# Patient Record
Sex: FEMALE | Race: WHITE | Employment: FULL TIME | URBAN - METROPOLITAN AREA
[De-identification: names, ages, dates, MRNs, and addresses within clinical notes are randomized per-mention and may not be internally consistent; named-entity substitution may affect disease eponyms.]

---

## 2022-02-15 ENCOUNTER — OFFICE VISIT (OUTPATIENT)
Dept: INTERNAL MEDICINE CLINIC | Age: 42
End: 2022-02-15
Payer: COMMERCIAL

## 2022-02-15 ENCOUNTER — TELEPHONE (OUTPATIENT)
Dept: INTERNAL MEDICINE CLINIC | Age: 42
End: 2022-02-15

## 2022-02-15 VITALS
OXYGEN SATURATION: 98 % | BODY MASS INDEX: 37.65 KG/M2 | TEMPERATURE: 98.7 F | SYSTOLIC BLOOD PRESSURE: 138 MMHG | HEART RATE: 76 BPM | HEIGHT: 70 IN | DIASTOLIC BLOOD PRESSURE: 79 MMHG | WEIGHT: 263 LBS | RESPIRATION RATE: 18 BRPM

## 2022-02-15 DIAGNOSIS — Z76.89 ENCOUNTER TO ESTABLISH CARE: ICD-10-CM

## 2022-02-15 DIAGNOSIS — Z02.89 ENCOUNTER FOR COMPLETION OF FORM WITH PATIENT: ICD-10-CM

## 2022-02-15 DIAGNOSIS — H93.11 TINNITUS, RIGHT: ICD-10-CM

## 2022-02-15 DIAGNOSIS — Z23 NEEDS FLU SHOT: ICD-10-CM

## 2022-02-15 DIAGNOSIS — J45.20 MILD INTERMITTENT ASTHMA WITHOUT COMPLICATION: ICD-10-CM

## 2022-02-15 DIAGNOSIS — R22.0 SCALP MASS: ICD-10-CM

## 2022-02-15 DIAGNOSIS — J45.20 MILD INTERMITTENT ASTHMA WITHOUT COMPLICATION: Primary | ICD-10-CM

## 2022-02-15 DIAGNOSIS — Z00.00 WELL ADULT HEALTH CHECK: Primary | ICD-10-CM

## 2022-02-15 PROCEDURE — 99204 OFFICE O/P NEW MOD 45 MIN: CPT | Performed by: INTERNAL MEDICINE

## 2022-02-15 PROCEDURE — 90471 IMMUNIZATION ADMIN: CPT | Performed by: INTERNAL MEDICINE

## 2022-02-15 PROCEDURE — 90686 IIV4 VACC NO PRSV 0.5 ML IM: CPT | Performed by: INTERNAL MEDICINE

## 2022-02-15 RX ORDER — ALBUTEROL SULFATE 0.83 MG/ML
SOLUTION RESPIRATORY (INHALATION)
COMMUNITY
End: 2022-02-15

## 2022-02-15 RX ORDER — NAPROXEN 250 MG/1
250 TABLET ORAL AS NEEDED
COMMUNITY

## 2022-02-15 RX ORDER — ALBUTEROL SULFATE 90 UG/1
2 AEROSOL, METERED RESPIRATORY (INHALATION)
Qty: 18 G | Refills: 2 | Status: SHIPPED | OUTPATIENT
Start: 2022-02-15 | End: 2022-03-09 | Stop reason: SDUPTHER

## 2022-02-15 NOTE — PROGRESS NOTES
rm 17    Chief Complaint   Patient presents with    New Patient     1. Have you been to the ER, urgent care clinic since your last visit? Hospitalized since your last visit? NO    2. Have you seen or consulted any other health care providers outside of the [address] [place] [address] since your last visit? Include any pap smears or colon screening.  No     Visit Vitals  /79 (BP 1 Location: Left arm, BP Patient Position: Sitting, BP Cuff Size: Adult)   Pulse 76   Temp 98.7 °F (37.1 °C) (Oral)   Resp 18   Ht 5' 10\" (1.778 m)   Wt 263 lb (119.3 kg)   SpO2 98%   BMI 37.74 kg/m²

## 2022-02-15 NOTE — TELEPHONE ENCOUNTER
Please review and prescribe an alternative if appropriate.    -------------------------------------------------  Dr. Luis Domínguez,             The [employer] is not covered by the pt's current insurance plan. Suggested alternatives are:  - Symbicort,  - Advair Diskus,   - Breo Ellipta INH,  - Advair HFA. Please review and prescribe alternative therapy or obtain a Prior Authorization. Requested Prescriptions     Pending Prescriptions Disp Refills    mometasone-formoterol (Dulera) 100-5 mcg/actuation HFA inhaler 13 g 3     Sig: Take 2 Puffs by inhalation two (2) times a day.

## 2022-02-15 NOTE — PATIENT INSTRUCTIONS
1.  Please follow the following instructions to process/authorize your referral, if needed:    Referrals processing  Please verify with your insurance IF you need referral authorization submitted. For insurance plans which require this, please follow the following steps. FAILURE TO DO SO MAY RESULT IN INABILITY TO SEE THE SPECIALIST YOU HAVE BEEN REFERRED TO (once you are scheduled to see them). 1. Call and schedule appointment with specialist  2. Call our clinic and leave message with provider name, and date of appointment  3. We will then submit the referral to your insurance. This process takes 2-5 business days. If you have questions about scheduling or authorizing referral, you can review with our referral coordinator. You can review with her today if available/if you have time, or you can call to review once you have made your referral/appointment. If you are not sure if you need referral authorizations, please review with the referral coordinator(s), either prior to or after you have made the appointment, as reviewed. 2.  If needed, you can see an ENT provider (Kath Favor and Throat specialists) at [facility] instead.     --[facility]:  Phone: [phone]

## 2022-02-15 NOTE — PROGRESS NOTES
Tracey Garcia (: [id number]) is a 38 y.o. female, new patient, here for evaluation of the following chief complaint(s):  Chief Complaint   Patient presents with    New Patient       Assessment and Plan:       ICD-10-CM ICD-9-CM    1. Mild intermittent asthma without complication  W26.13 929.64 mometasone-formoterol (Dulera) 100-5 mcg/actuation HFA inhaler      albuterol (PROVENTIL HFA, VENTOLIN HFA, PROAIR HFA) 90 mcg/actuation inhaler   2. Needs flu shot  Z23 V04.81 INFLUENZA VIRUS VAC QUAD,SPLIT,PRESV FREE SYRINGE IM   3. Scalp mass--frontal, in hair line, for past year  R22.0 782.2 REFERRAL TO ENT-OTOLARYNGOLOGY   4. Tinnitus, right  H93.11 388.30 REFERRAL TO ENT-OTOLARYNGOLOGY   5. Encounter for completion of form with patient  Z02.89 V68.89    6. Encounter to establish care  Z76.89 V65.8        1. Refills/new order reviewed at visit. 2.  Immunization(s) reviewed and updated at visit.    3-4. Findings and evaluation reviewed at visit. Referral(s) and referral coordination reviewed with patient at visit. 5.  DMV form completed at visit as reviewed. Follow-up and Dispositions    · Return in about 6 weeks (around 3/29/2022) for referral follow-up, yearly physical, fasting labs. lab results and schedule of future lab studies reviewed with patient  reviewed medications and side effects in detail    For additional documentation of information and/or recommendations discussed this visit, please see notes in instructions. Plan and evaluation (above) reviewed with pt at visit  Patient voiced understanding of plan and provided with time to ask/review questions. After Visit Summary (AVS) provided to pt after visit with additional instructions as needed/reviewed. Future Appointments   Date Time Provider Adarsh Connor   3/29/2022  1:30 PM Kristin Enriquez MD [facility]   --Updated future visits after patient check-out.       History of Present Illness:     Notes (nursing/rooming note copied below in italics):  As above    Here to establish care. Pt has no records in [facility] or [facility]. Only Care Everywhere records are UC/testing for COVID x 2. Both negative in 2020. She has moved from [place]. Needs handicapped placard replaced for [facility]. Notes had Workman's Comp eval over 3yr in [place]. She had an injury to her lower back. She has a nerve problem in her lower back with right sciatic-type pain. She had hurt her back pushing a \"gentleman in a wheelchair\". She had \"partial disability, restriction of ROM and restriction of lifting\". She is not following with specialists at this time. She had a PT plan and knows what exercises to do--just not doing that much now. Has no needs for daily activity/ADL mgt with current symptoms. She will have intermittent symptoms, but not chronic pain. No need identified for pain mgt, orthopedics, or PT at this time. She has a permanent disability. DMV form reviewed and completed at visit. Here 1.5yrs. She used Dulera regularly when in [place]. She notes she has asthma. She does not use controller regularly here. Has only used few times here. She doesn't need regularly now. Has rescue inhaler       She has a slowly enlarging mass of her midline frontal area. Now 1cm, slightly tender, but has enlarged over past year. G      She has had hysterectomy but has tubes/ovaries. She had recommendation with PCP for no further gyn screeing. Reviewed gyn referral, eval here or female provider PRN. She has not had screening mammogram yet. No problems noted on prior labs, so screening fasting labs at follow-up reviewed. Youngest child 22yr old. She thinks has Tetanus in last 10yrs--will clarify. Has some childhood vaccines she will send to us in [address] Nursing screenings reviewed by provider at visit. Prior to Admission medications    Medication Sig Start Date End Date Taking? Authorizing Provider   naproxen (NAPROSYN) 250 mg tablet Take 250 mg by mouth as needed. Yes Provider, Historical   albuterol (PROVENTIL VENTOLIN) 2.5 mg /3 mL (0.083 %) nebu by Nebulization route. Yes Provider, Historical   mometasone-formoterol (Dulera) 100-5 mcg/actuation HFA inhaler Take 2 Puffs by inhalation two (2) times a day. Yes Provider, Historical        ROS    Vitals:    02/15/22 0924   BP: 138/79   Pulse: 76   Resp: 18   Temp: 98.7 °F (37.1 °C)   TempSrc: Oral   SpO2: 98%   Weight: 263 lb (119.3 kg)   Height: 5' 10\" (1.778 m)      Body mass index is 37.74 kg/m². Physical Exam:     Physical Exam  Vitals and nursing note reviewed. Constitutional:       General: She is not in acute distress. Appearance: Normal appearance. She is well-developed. She is not diaphoretic. HENT:      Head: Normocephalic and atraumatic. Right Ear: Tympanic membrane, ear canal and external ear normal.      Left Ear: Tympanic membrane, ear canal and external ear normal.      Mouth/Throat:      Mouth: Mucous membranes are moist.   Eyes:      General: No scleral icterus. Right eye: No discharge. Left eye: No discharge. Conjunctiva/sclera: Conjunctivae normal.   Neck:      Comments: No carotid bruits bilat. Cardiovascular:      Rate and Rhythm: Normal rate and regular rhythm. Pulses: Normal pulses. Heart sounds: Normal heart sounds. No murmur heard. No friction rub. No gallop. Pulmonary:      Effort: Pulmonary effort is normal. No respiratory distress. Breath sounds: Normal breath sounds. No stridor. No wheezing, rhonchi or rales. Abdominal:      General: Bowel sounds are normal. There is no distension. Palpations: Abdomen is soft. Tenderness: There is no abdominal tenderness. There is no guarding. Musculoskeletal:         General: No swelling, tenderness, deformity or signs of injury. Cervical back: Neck supple. No rigidity or tenderness.  No muscular tenderness. Right lower leg: No edema. Left lower leg: No edema. Comments: Localizes intermittent pain to right lower lumbar area, and right thigh. Lymphadenopathy:      Cervical: No cervical adenopathy. Skin:     General: Skin is warm. Coloration: Skin is not jaundiced or pale. Findings: No bruising, erythema or rash. Neurological:      General: No focal deficit present. Mental Status: She is alert. Motor: No abnormal muscle tone. Coordination: Coordination normal.      Gait: Gait normal.   Psychiatric:         Mood and Affect: Mood normal.         Behavior: Behavior normal.         Thought Content: Thought content normal.         Judgment: Judgment normal.         An electronic signature was used to authenticate this note.   -- Jeison Rosales MD

## 2022-02-21 NOTE — TELEPHONE ENCOUNTER
The pharmacist called from [employer] stating that the pt would like  to call Advair 250 mcg to replace the [facility]. Since the Advair only has a co-pay of $10.00 co-pay.

## 2022-03-03 NOTE — TELEPHONE ENCOUNTER
here--pt prefers to do labs prior to follow-up appt:    Reviewed last note to establish care, and reviewed labs as below:    Future Appointments   Date Time Provider Adarsh Connor   3/9/2022 11:45 AM Lon Porter MD [facility]     Diagnoses and all orders for this visit:    1. Well adult health check  -     CBC WITH AUTOMATED DIFF; Future  -     METABOLIC PANEL, COMPREHENSIVE; Future  -     LIPID PANEL; Future  -     HEMOGLOBIN A1C WITH EAG; Future    2. Mild intermittent asthma without complication      Labs to  to review with pt. He is on her release for all info.

## 2022-03-09 ENCOUNTER — OFFICE VISIT (OUTPATIENT)
Dept: INTERNAL MEDICINE CLINIC | Age: 42
End: 2022-03-09
Payer: COMMERCIAL

## 2022-03-09 VITALS
HEART RATE: 70 BPM | OXYGEN SATURATION: 98 % | RESPIRATION RATE: 16 BRPM | WEIGHT: 264 LBS | DIASTOLIC BLOOD PRESSURE: 76 MMHG | SYSTOLIC BLOOD PRESSURE: 126 MMHG | BODY MASS INDEX: 37.88 KG/M2

## 2022-03-09 DIAGNOSIS — Z00.00 WELL ADULT HEALTH CHECK: ICD-10-CM

## 2022-03-09 DIAGNOSIS — Z12.31 ENCOUNTER FOR SCREENING MAMMOGRAM FOR MALIGNANT NEOPLASM OF BREAST: ICD-10-CM

## 2022-03-09 DIAGNOSIS — J45.20 MILD INTERMITTENT ASTHMA WITHOUT COMPLICATION: ICD-10-CM

## 2022-03-09 DIAGNOSIS — Z00.00 WELL WOMAN EXAM (NO GYNECOLOGICAL EXAM): Primary | ICD-10-CM

## 2022-03-09 PROCEDURE — 99396 PREV VISIT EST AGE 40-64: CPT | Performed by: INTERNAL MEDICINE

## 2022-03-09 RX ORDER — BUDESONIDE AND FORMOTEROL FUMARATE DIHYDRATE 160; 4.5 UG/1; UG/1
2 AEROSOL RESPIRATORY (INHALATION) 2 TIMES DAILY
Qty: 3 EACH | Refills: 3 | Status: SHIPPED | OUTPATIENT
Start: 2022-03-09

## 2022-03-09 RX ORDER — ALBUTEROL SULFATE 90 UG/1
2 AEROSOL, METERED RESPIRATORY (INHALATION)
Qty: 18 G | Refills: 2 | Status: SHIPPED | OUTPATIENT
Start: 2022-03-09

## 2022-03-09 NOTE — PROGRESS NOTES
Chief Complaint   Patient presents with    Complete Physical    Alopecia     Visit Vitals  /76   Pulse 70   Resp 16   Wt 264 lb (119.7 kg)   SpO2 98%   BMI 37.88 kg/m²     1. Have you been to the ER, urgent care clinic since your last visit? Hospitalized since your last visit? NO    2. Have you seen or consulted any other health care providers outside of the [address] [place] [address] since your last visit? Include any pap smears or colon screening.  NO

## 2022-03-09 NOTE — PROGRESS NOTES
Jaquelin Yao (: [id number]) is a 38 y.o. female, established patient, here for evaluation of the following chief complaint(s):  Chief Complaint   Patient presents with    Complete Physical    Alopecia       Assessment and Plan:       ICD-10-CM ICD-9-CM    1. Well woman exam (no gynecological exam)  Z00.00 V70.0 BENEDICT MAMMO BI SCREENING INCL CAD      US BREAST RT LIMITED=<3 QUAD   2. Encounter for screening mammogram for malignant neoplasm of breast  Z12.31 V76.12 BENEDICT MAMMO BI SCREENING INCL CAD      US BREAST RT LIMITED=<3 QUAD   3. Mild intermittent asthma without complication  T21.22 779.77 mometasone-formoterol (Dulera) 100-5 mcg/actuation HFA inhaler      albuterol (PROVENTIL HFA, VENTOLIN HFA, PROAIR HFA) 90 mcg/actuation inhaler   4. Well adult health check  Z00.00 V70.0 HEMOGLOBIN A1C WITH EAG      LIPID PANEL      METABOLIC PANEL, COMPREHENSIVE      CBC WITH AUTOMATED DIFF       1-2, 4:  Screenings reviewed at visit. Imaging and scheduling reviewed. 3.  Re-sent scripts as reviewed/requested. Follow-up and Dispositions    · Return in about 3 months (around 2022), or if symptoms worsen or fail to improve, for medication follow-up.       lab results and schedule of future lab studies reviewed with patient  reviewed medications and side effects in detail    For additional documentation of information and/or recommendations discussed this visit, please see notes in instructions. Plan and evaluation (above) reviewed with pt at visit  Patient voiced understanding of plan and provided with time to ask/review questions. After Visit Summary (AVS) provided to pt after visit with additional instructions as needed/reviewed. Future Appointments   Date Time Provider Adarsh Connor   3/24/2022  1:00 PM SHAMIKA [facility] [address] [facility]   --Updated future visits after patient check-out. History of Present Illness:     Notes (nursing/rooming note copied below in italics):   As above    She has had hysterectomy. She had no prior breast imaging. No abnormal findings on self-exam.    Reviewed mammogram     There are no preventive care reminders to display for this patient. HM reviewed with pt at visit. Nursing screenings reviewed by provider at visit. Past Medical History:   Diagnosis Date    Asthma      Past Surgical History:   Procedure Laterality Date    HX HYSTERECTOMY  2014        Prior to Admission medications    Medication Sig Start Date End Date Taking? Authorizing Provider   naproxen (NAPROSYN) 250 mg tablet Take 250 mg by mouth as needed. Yes Provider, Historical   mometasone-formoterol (Dulera) 100-5 mcg/actuation HFA inhaler Take 2 Puffs by inhalation two (2) times a day. 2/15/22  Yes Eliud Millard MD   albuterol (PROVENTIL HFA, VENTOLIN HFA, PROAIR HFA) 90 mcg/actuation inhaler Take 2 Puffs by inhalation every four (4) hours as needed for Wheezing. 2/15/22  Yes Eliud Millard MD        ROS    Vitals:    03/09/22 1144   BP: 126/76   Pulse: 70   Resp: 16   SpO2: 98%   Weight: 264 lb (119.7 kg)   PainSc:   0 - No pain      Body mass index is 37.88 kg/m². Physical Exam:     Physical Exam  Vitals and nursing note reviewed. Constitutional:       General: She is not in acute distress. Appearance: Normal appearance. She is well-developed. She is not diaphoretic. HENT:      Head: Normocephalic and atraumatic. Mouth/Throat:      Mouth: Mucous membranes are moist.   Eyes:      General: No scleral icterus. Right eye: No discharge. Left eye: No discharge. Conjunctiva/sclera: Conjunctivae normal.   Cardiovascular:      Rate and Rhythm: Normal rate and regular rhythm. Pulses: Normal pulses. Heart sounds: Normal heart sounds. No murmur heard. No friction rub. No gallop. Pulmonary:      Effort: Pulmonary effort is normal. No respiratory distress. Breath sounds: Normal breath sounds. No stridor.  No wheezing, rhonchi or rales. Abdominal:      General: Bowel sounds are normal. There is no distension. Palpations: Abdomen is soft. Tenderness: There is no abdominal tenderness. There is no guarding. Musculoskeletal:         General: No swelling, tenderness, deformity or signs of injury. Cervical back: Neck supple. No rigidity or tenderness. No muscular tenderness. Right lower leg: No edema. Left lower leg: No edema. Lymphadenopathy:      Cervical: No cervical adenopathy. Skin:     General: Skin is warm. Coloration: Skin is not jaundiced or pale. Findings: No bruising, erythema or rash. Neurological:      General: No focal deficit present. Mental Status: She is alert. Motor: No abnormal muscle tone. Coordination: Coordination normal.      Gait: Gait normal.   Psychiatric:         Mood and Affect: Mood normal.         Behavior: Behavior normal.         Thought Content: Thought content normal.         Judgment: Judgment normal.       Breast exam:  Breast exam with no masses palpated bilaterally. No skin changes or nipple discharge bilaterally. No axillary lymphadenopathy noted bilaterally. Pt and partner (with her consent) instructed in breast self exam during physician physical exam.  Nurse chaperone (CM) present for breast exam.    Fibrocystic changes lateral upper quadrant right breast (base), which are slightly tender. Non-tender fibrocystic changes upper inner quadrant right breast and lower inner quadrant left breast.  Findings reviewed with pt/partner, and eval with mammogram reviewed. An electronic signature was used to authenticate this note.   -- Ros Grullon MD

## 2022-03-09 NOTE — PATIENT INSTRUCTIONS
Comments for breast imaging:  Fibrocystic changes right upper lateral base, which are slightly tender. Non-tender fibrocystic changes upper inner right breast and lower inner left breast.     Well Visit, Ages 25 to 48: Care Instructions  Overview     Well visits can help you stay healthy. Your doctor has checked your overall health and may have suggested ways to take good care of yourself. Your doctor also may have recommended tests. At home, you can help prevent illness with healthy eating, regular exercise, and other steps. Follow-up care is a key part of your treatment and safety. Be sure to make and go to all appointments, and call your doctor if you are having problems. It's also a good idea to know your test results and keep a list of the medicines you take. How can you care for yourself at home? · Get screening tests that you and your doctor decide on. Screening helps find diseases before any symptoms appear. · Eat healthy foods. Choose fruits, vegetables, whole grains, protein, and low-fat dairy foods. Limit fat, especially saturated fat. Reduce salt in your diet. · Limit alcohol. If you are a man, have no more than 2 drinks a day or 14 drinks a week. If you are a woman, have no more than 1 drink a day or 7 drinks a week. · Get at least 30 minutes of physical activity on most days of the week. Walking is a good choice. You also may want to do other activities, such as running, swimming, cycling, or playing tennis or team sports. Discuss any changes in your exercise program with your doctor. · Reach and stay at a healthy weight. This will lower your risk for many problems, such as obesity, diabetes, heart disease, and high blood pressure. · Do not smoke or allow others to smoke around you. If you need help quitting, talk to your doctor about stop-smoking programs and medicines. These can increase your chances of quitting for good. · Care for your mental health.  It is easy to get weighed down by worry and stress. Learn strategies to manage stress, like deep breathing and mindfulness, and stay connected with your family and community. If you find you often feel sad or hopeless, talk with your doctor. Treatment can help. · Talk to your doctor about whether you have any risk factors for sexually transmitted infections (STIs). You can help prevent STIs if you wait to have sex with a new partner (or partners) until you've each been tested for STIs. It also helps if you use condoms (male or female condoms) and if you limit your sex partners to one person who only has sex with you. Vaccines are available for some STIs, such as HPV. · Use birth control if it's important to you to prevent pregnancy. Talk with your doctor about the choices available and what might be best for you. · If you think you may have a problem with alcohol or drug use, talk to your doctor. This includes prescription medicines (such as amphetamines and opioids) and illegal drugs (such as cocaine and methamphetamine). Your doctor can help you figure out what type of treatment is best for you. · Protect your skin from too much sun. When you're outdoors from 10 a.m. to 4 p.m., stay in the shade or cover up with clothing and a hat with a wide brim. Wear sunglasses that block UV rays. Even when it's cloudy, put broad-spectrum sunscreen (SPF 30 or higher) on any exposed skin. · See a dentist one or two times a year for checkups and to have your teeth cleaned. · Wear a seat belt in the car. When should you call for help? Watch closely for changes in your health, and be sure to contact your doctor if you have any problems or symptoms that concern you. Where can you learn more? Go to http://www."[employer].com/  Enter P072 in the search box to learn more about \"Well Visit, Ages 25 to 48: Care Instructions. \"  Current as of: October 6, 2021               Content Version: 13.2  © 9407-3590 Healthwise, [facility].    Care instructions adapted under license by [employer] (which disclaims liability or warranty for this information). If you have questions about a medical condition or this instruction, always ask your healthcare professional. [address] any warranty or liability for your use of this information.

## 2022-03-10 LAB
ALBUMIN SERPL-MCNC: 4 G/DL (ref 3.5–5)
ALBUMIN/GLOB SERPL: 1 {RATIO} (ref 1.1–2.2)
ALP SERPL-CCNC: 82 U/L (ref 45–117)
ALT SERPL-CCNC: 28 U/L (ref 12–78)
ANION GAP SERPL CALC-SCNC: 3 MMOL/L (ref 5–15)
AST SERPL-CCNC: 16 U/L (ref 15–37)
BASOPHILS # BLD: 0 K/UL (ref 0–0.1)
BASOPHILS NFR BLD: 0 % (ref 0–1)
BILIRUB SERPL-MCNC: 0.9 MG/DL (ref 0.2–1)
BUN SERPL-MCNC: 11 MG/DL (ref 6–20)
BUN/CREAT SERPL: 14 (ref 12–20)
CALCIUM SERPL-MCNC: 9.9 MG/DL (ref 8.5–10.1)
CHLORIDE SERPL-SCNC: 109 MMOL/L (ref 97–108)
CHOLEST SERPL-MCNC: 193 MG/DL
CO2 SERPL-SCNC: 25 MMOL/L (ref 21–32)
CREAT SERPL-MCNC: 0.8 MG/DL (ref 0.55–1.02)
DIFFERENTIAL METHOD BLD: NORMAL
EOSINOPHIL # BLD: 0.1 K/UL (ref 0–0.4)
EOSINOPHIL NFR BLD: 1 % (ref 0–7)
ERYTHROCYTE [DISTWIDTH] IN BLOOD BY AUTOMATED COUNT: 13.7 % (ref 11.5–14.5)
EST. AVERAGE GLUCOSE BLD GHB EST-MCNC: 105 MG/DL
GLOBULIN SER CALC-MCNC: 4.2 G/DL (ref 2–4)
GLUCOSE SERPL-MCNC: 79 MG/DL (ref 65–100)
HBA1C MFR BLD: 5.3 % (ref 4–5.6)
HCT VFR BLD AUTO: 44 % (ref 35–47)
HDLC SERPL-MCNC: 41 MG/DL
HDLC SERPL: 4.7 {RATIO} (ref 0–5)
HGB BLD-MCNC: 14.3 G/DL (ref 11.5–16)
IMM GRANULOCYTES # BLD AUTO: 0 K/UL (ref 0–0.04)
IMM GRANULOCYTES NFR BLD AUTO: 0 % (ref 0–0.5)
LDLC SERPL CALC-MCNC: 129.6 MG/DL (ref 0–100)
LYMPHOCYTES # BLD: 3.2 K/UL (ref 0.8–3.5)
LYMPHOCYTES NFR BLD: 32 % (ref 12–49)
MCH RBC QN AUTO: 28.6 PG (ref 26–34)
MCHC RBC AUTO-ENTMCNC: 32.5 G/DL (ref 30–36.5)
MCV RBC AUTO: 88 FL (ref 80–99)
MONOCYTES # BLD: 0.6 K/UL (ref 0–1)
MONOCYTES NFR BLD: 6 % (ref 5–13)
NEUTS SEG # BLD: 6.2 K/UL (ref 1.8–8)
NEUTS SEG NFR BLD: 61 % (ref 32–75)
NRBC # BLD: 0 K/UL (ref 0–0.01)
NRBC BLD-RTO: 0 PER 100 WBC
PLATELET # BLD AUTO: 272 K/UL (ref 150–400)
PMV BLD AUTO: 10.9 FL (ref 8.9–12.9)
POTASSIUM SERPL-SCNC: 4.2 MMOL/L (ref 3.5–5.1)
PROT SERPL-MCNC: 8.2 G/DL (ref 6.4–8.2)
RBC # BLD AUTO: 5 M/UL (ref 3.8–5.2)
SODIUM SERPL-SCNC: 137 MMOL/L (ref 136–145)
TRIGL SERPL-MCNC: 112 MG/DL (ref ?–150)
VLDLC SERPL CALC-MCNC: 22.4 MG/DL
WBC # BLD AUTO: 10.1 K/UL (ref 3.6–11)

## 2022-03-24 ENCOUNTER — HOSPITAL ENCOUNTER (OUTPATIENT)
Dept: MAMMOGRAPHY | Age: 42
Discharge: HOME | End: 2022-03-24
Attending: INTERNAL MEDICINE
Payer: COMMERCIAL

## 2022-03-24 ENCOUNTER — TRANSCRIBE ORDER (OUTPATIENT)
Dept: GENERAL RADIOLOGY | Age: 42
End: 2022-03-24

## 2022-03-24 DIAGNOSIS — Z12.31 SCREENING MAMMOGRAM, ENCOUNTER FOR: ICD-10-CM

## 2022-03-24 DIAGNOSIS — Z12.31 SCREENING MAMMOGRAM, ENCOUNTER FOR: Primary | ICD-10-CM

## 2022-03-24 PROCEDURE — 77063 BREAST TOMOSYNTHESIS BI: CPT

## 2022-03-25 ENCOUNTER — TELEPHONE (OUTPATIENT)
Dept: INTERNAL MEDICINE CLINIC | Age: 42
End: 2022-03-25

## 2022-03-25 NOTE — TELEPHONE ENCOUNTER
----- Message from Gely Willett sent at 7/05/3476  2:10 PM EST -----  Regarding: Inhaler approval  The [facility] pharmacy has contacted me about the Simbacort inhaler and unfortunately it is too expensive. Can you please call in for Advair as it has only a $10 co-pay in which i can afford? There was a request put in on 2/15 but it doesn't look like it was ever approved.      Thank you

## 2023-03-28 ENCOUNTER — HOSPITAL ENCOUNTER (OUTPATIENT)
Dept: MAMMOGRAPHY | Age: 43
Discharge: HOME | End: 2023-03-28
Attending: INTERNAL MEDICINE
Payer: COMMERCIAL

## 2023-03-28 DIAGNOSIS — Z12.31 ENCOUNTER FOR SCREENING MAMMOGRAM FOR MALIGNANT NEOPLASM OF BREAST: ICD-10-CM

## 2023-03-28 DIAGNOSIS — Z00.00 WELL WOMAN EXAM (NO GYNECOLOGICAL EXAM): ICD-10-CM

## 2023-03-28 PROCEDURE — 77063 BREAST TOMOSYNTHESIS BI: CPT

## 2023-05-22 ENCOUNTER — OFFICE VISIT (OUTPATIENT)
Age: 43
End: 2023-05-22
Payer: COMMERCIAL

## 2023-05-22 ENCOUNTER — TELEPHONE (OUTPATIENT)
Age: 43
End: 2023-05-22

## 2023-05-22 ENCOUNTER — NURSE TRIAGE (OUTPATIENT)
Dept: OTHER | Facility: CLINIC | Age: 43
End: 2023-05-22

## 2023-05-22 VITALS
TEMPERATURE: 98.5 F | SYSTOLIC BLOOD PRESSURE: 120 MMHG | DIASTOLIC BLOOD PRESSURE: 79 MMHG | BODY MASS INDEX: 37.99 KG/M2 | HEIGHT: 70 IN | WEIGHT: 265.4 LBS | OXYGEN SATURATION: 97 % | RESPIRATION RATE: 16 BRPM | HEART RATE: 68 BPM

## 2023-05-22 DIAGNOSIS — T78.40XA ALLERGY, INITIAL ENCOUNTER: ICD-10-CM

## 2023-05-22 DIAGNOSIS — J45.41 MODERATE PERSISTENT ASTHMA WITH ACUTE EXACERBATION: Primary | ICD-10-CM

## 2023-05-22 PROCEDURE — 99214 OFFICE O/P EST MOD 30 MIN: CPT | Performed by: NURSE PRACTITIONER

## 2023-05-22 PROCEDURE — 94640 AIRWAY INHALATION TREATMENT: CPT | Performed by: NURSE PRACTITIONER

## 2023-05-22 RX ORDER — MONTELUKAST SODIUM 10 MG/1
10 TABLET ORAL DAILY
Qty: 30 TABLET | Refills: 3 | Status: SHIPPED | OUTPATIENT
Start: 2023-05-22

## 2023-05-22 RX ORDER — AZELASTINE 1 MG/ML
1 SPRAY, METERED NASAL 2 TIMES DAILY
Qty: 60 ML | Refills: 1 | Status: SHIPPED | OUTPATIENT
Start: 2023-05-22

## 2023-05-22 RX ORDER — ALBUTEROL SULFATE 90 UG/1
2 AEROSOL, METERED RESPIRATORY (INHALATION) EVERY 4 HOURS PRN
Qty: 18 G | Refills: 3 | Status: SHIPPED | OUTPATIENT
Start: 2023-05-22

## 2023-05-22 RX ORDER — PREDNISONE 20 MG/1
40 TABLET ORAL DAILY
Qty: 10 TABLET | Refills: 0 | Status: SHIPPED | OUTPATIENT
Start: 2023-05-22 | End: 2023-05-27

## 2023-05-22 RX ORDER — ALBUTEROL SULFATE 2.5 MG/3ML
2.5 SOLUTION RESPIRATORY (INHALATION) ONCE
Status: COMPLETED | OUTPATIENT
Start: 2023-05-22 | End: 2023-05-22

## 2023-05-22 RX ORDER — ALBUTEROL SULFATE 2.5 MG/3ML
2.5 SOLUTION RESPIRATORY (INHALATION) EVERY 4 HOURS PRN
Qty: 120 EACH | Refills: 3 | Status: SHIPPED | OUTPATIENT
Start: 2023-05-22

## 2023-05-22 RX ORDER — BUDESONIDE AND FORMOTEROL FUMARATE DIHYDRATE 160; 4.5 UG/1; UG/1
2 AEROSOL RESPIRATORY (INHALATION) 2 TIMES DAILY
Qty: 30.6 G | Refills: 2 | Status: SHIPPED | OUTPATIENT
Start: 2023-05-22

## 2023-05-22 RX ADMIN — ALBUTEROL SULFATE 2.5 MG: 2.5 SOLUTION RESPIRATORY (INHALATION) at 10:11

## 2023-05-22 SDOH — ECONOMIC STABILITY: FOOD INSECURITY: WITHIN THE PAST 12 MONTHS, THE FOOD YOU BOUGHT JUST DIDN'T LAST AND YOU DIDN'T HAVE MONEY TO GET MORE.: NEVER TRUE

## 2023-05-22 SDOH — ECONOMIC STABILITY: HOUSING INSECURITY
IN THE LAST 12 MONTHS, WAS THERE A TIME WHEN YOU DID NOT HAVE A STEADY PLACE TO SLEEP OR SLEPT IN A SHELTER (INCLUDING NOW)?: NO

## 2023-05-22 SDOH — ECONOMIC STABILITY: FOOD INSECURITY: WITHIN THE PAST 12 MONTHS, YOU WORRIED THAT YOUR FOOD WOULD RUN OUT BEFORE YOU GOT MONEY TO BUY MORE.: NEVER TRUE

## 2023-05-22 SDOH — ECONOMIC STABILITY: INCOME INSECURITY: HOW HARD IS IT FOR YOU TO PAY FOR THE VERY BASICS LIKE FOOD, HOUSING, MEDICAL CARE, AND HEATING?: NOT HARD AT ALL

## 2023-05-22 ASSESSMENT — PATIENT HEALTH QUESTIONNAIRE - PHQ9
SUM OF ALL RESPONSES TO PHQ QUESTIONS 1-9: 0
2. FEELING DOWN, DEPRESSED OR HOPELESS: 0
SUM OF ALL RESPONSES TO PHQ9 QUESTIONS 1 & 2: 0
SUM OF ALL RESPONSES TO PHQ QUESTIONS 1-9: 0
1. LITTLE INTEREST OR PLEASURE IN DOING THINGS: 0

## 2023-05-22 ASSESSMENT — SOCIAL DETERMINANTS OF HEALTH (SDOH): HOW HARD IS IT FOR YOU TO PAY FOR THE VERY BASICS LIKE FOOD, HOUSING, MEDICAL CARE, AND HEATING?: NOT HARD AT ALL

## 2023-05-22 NOTE — TELEPHONE ENCOUNTER
Location of patient: [facility]    Received call from [facility] with [employer]. Subjective: Caller states \"allergies\"     Current Symptoms: head congestion stuffy runny nose and chest congestion coughing , dif breathing , wheezing , h/a,symptoms comes and goes thru out the day , pain in chest also hx asthma, and is out of her inhalers sob mainly with activity , mucus clear not wheezing currently, needs her inhaler refilled     Onset: months    Associated Symptoms: NA    Pain Severity: 6/10    Temperature: none  as far as knows     What has been tried: allergy meds    LMP: NA Pregnant: NA    Recommended disposition: See in Office Today or Tomorrow    Care advice provided, patient verbalizes understanding; denies any other questions or concerns; instructed to call back for any new or worsening symptoms. Patient/Caller agrees with recommended disposition; writer provided warm transfer to Evelin Wilson at [facility] for appointment scheduling    Attention Provider: Thank you for allowing me to participate in the care of your patient. The patient was connected to triage in response to information provided to the ECC/PSC. Please do not respond through this encounter as the response is not directed to a shared pool.          Reason for Disposition   Difficulty breathing and only from stuffy nose   Lots of coughing    Protocols used: Breathing Difficulty-ADULT-[place], Sinus Pain or Congestion-ADULT-[place]

## 2023-05-22 NOTE — PROGRESS NOTES
Chief Complaint   Patient presents with    Cough     Nasal congestion, cough, wheezing x 3 months       Vitals:    05/22/23 0918   BP: 120/79   Pulse: 68   Resp: 16   Temp: 98.5 °F (36.9 °C)   SpO2: 97%       No flowsheet data found. 1. \"Have you been to the ER, urgent care clinic since your last visit? Hospitalized since your last visit? \" No    2. \"Have you seen or consulted any other health care providers outside of the [address] [place] [address] since your last visit? \" No     3. For patients aged 39-70: Has the patient had a colonoscopy / FIT/ Cologuard? No      If the patient is female:    4. For patients aged 41-77: Has the patient had a mammogram within the past 2 years? No      5. For patients aged 21-65: Has the patient had a pap smear? No     Health Maintenance Due   Topic Date Due    Varicella vaccine (1 of 2 - 2-dose childhood series) Never done    HIV screen  Never done    Depression Screen  03/09/2023       No flowsheet data found. Social Determinants of Health     Tobacco Use: Medium Risk    Smoking Tobacco Use: Former    Smokeless Tobacco Use: Never    Passive Exposure: Not on file   Alcohol Use: Not on file   Financial Resource Strain: Low Risk     Difficulty of Paying Living Expenses: Not hard at all   Food Insecurity: No Food Insecurity    Worried About [address] in the Last Year: Never true    [address] in the Last Year: Never true   Transportation Needs: Unknown    Lack of Transportation (Medical): Not on file    Lack of Transportation (Non-Medical):  No   Physical Activity: Not on file   Stress: Not on file   Social Connections: Not on file   Intimate Partner Violence: Not on file   Depression: Not at risk    PHQ-2 Score: 0   Housing Stability: Unknown    Unable to Pay for Housing in the Last Year: Not on file    Number of Places Lived in the Last Year: Not on file    Unstable Housing in the Last Year: No      AVS  education, follow up, and recommendations provided and

## 2023-05-22 NOTE — PROGRESS NOTES
Subjective:      Patient ID: Jossue Gill is a 41 y.o. female. HPI    Chief Complaint   Patient presents with    Cough     Nasal congestion, cough, wheezing x 3 months      Itchy watery eyes, runny nose, cough    Out of asthma inhalers for sometime partly d/t change in  insurance     She has tied Zyrtec and  Allegra currently, Benadryl, Aleve D for allergy symptoms     Usually has seasonal allergies; current symptoms more severe  No history of hospitalization for asthma     No pattern to symptoms. Symptoms are random    Works from home, changed filters       Past Medical History:   Diagnosis Date    Asthma     Menopause           Allergies   Allergen Reactions    Cephalosporins Hives    Oxycodone-Acetaminophen Itching    Penicillins Hives        Current Outpatient Medications   Medication Sig    Fexofenadine-Pseudoephedrine (ALLEGRA-D 24 HOUR PO) Take by mouth    albuterol sulfate HFA (PROVENTIL;VENTOLIN;PROAIR) 108 (90 Base) MCG/ACT inhaler Inhale 2 puffs into the lungs every 4 hours as needed for Wheezing or Shortness of Breath    montelukast (SINGULAIR) 10 MG tablet Take 1 tablet by mouth daily    albuterol (PROVENTIL) (2.5 MG/3ML) 0.083% nebulizer solution Take 3 mLs by nebulization every 4 hours as needed for Wheezing    azelastine (ASTELIN) 0.1 % nasal spray 1 spray by Nasal route 2 times daily Use in each nostril as directed    budesonide-formoterol (SYMBICORT) 160-4.5 MCG/ACT AERO Inhale 2 puffs into the lungs 2 times daily    predniSONE (DELTASONE) 20 MG tablet Take 2 tablets by mouth daily for 5 days    naproxen (NAPROSYN) 250 MG tablet Take 1 tablet by mouth as needed     No current facility-administered medications for this visit. Past Surgical History:   Procedure Laterality Date    HYSTERECTOMY (CERVIX STATUS UNKNOWN)  2014          Review of Systems   Constitutional:  Positive for fatigue. HENT:  Positive for congestion. Eyes: Negative.     Respiratory:  Positive for chest tightness,

## 2023-05-24 ASSESSMENT — ENCOUNTER SYMPTOMS
EYES NEGATIVE: 1
CHEST TIGHTNESS: 1
SHORTNESS OF BREATH: 1
WHEEZING: 1

## 2023-05-27 RX ORDER — FLUTICASONE PROPIONATE AND SALMETEROL 250; 50 UG/1; UG/1
1 POWDER RESPIRATORY (INHALATION) EVERY 12 HOURS
Qty: 60 EACH | Refills: 3 | Status: SHIPPED | OUTPATIENT
Start: 2023-05-27

## 2023-05-27 RX ORDER — LEVALBUTEROL INHALATION SOLUTION 0.31 MG/3ML
0.31 SOLUTION RESPIRATORY (INHALATION) EVERY 4 HOURS PRN
Qty: 90 ML | Refills: 1 | Status: SHIPPED | OUTPATIENT
Start: 2023-05-27

## 2023-06-08 ENCOUNTER — TELEMEDICINE (OUTPATIENT)
Age: 43
End: 2023-06-08
Payer: COMMERCIAL

## 2023-06-08 DIAGNOSIS — J45.41 MODERATE PERSISTENT ASTHMA WITH ACUTE EXACERBATION: Primary | ICD-10-CM

## 2023-06-08 PROCEDURE — 99213 OFFICE O/P EST LOW 20 MIN: CPT | Performed by: NURSE PRACTITIONER

## 2023-06-08 RX ORDER — FLUTICASONE PROPIONATE 110 UG/1
2 AEROSOL, METERED RESPIRATORY (INHALATION) 2 TIMES DAILY
Qty: 12 G | Refills: 3 | Status: SHIPPED | OUTPATIENT
Start: 2023-06-08 | End: 2024-06-07

## 2023-07-10 ASSESSMENT — ENCOUNTER SYMPTOMS
CHEST TIGHTNESS: 1
SHORTNESS OF BREATH: 0
WHEEZING: 1

## 2023-07-10 NOTE — PROGRESS NOTES
Subjective:      Patient ID: Zack Wright is a 41 y.o. female. Zack Wright, was evaluated through a synchronous (real-time) audio-video encounter. The patient (or guardian if applicable) is aware that this is a billable service, which includes applicable co-pays. This Virtual Visit was conducted with patient's (and/or legal guardian's) consent. Patient identification was verified, and a caregiver was present when appropriate. The patient was located at Home: [address] [place] [address]  Provider was located at [facility] (Appt Dept): [address] [facility],  [address] [facility]     Asthma  She complains of wheezing. There is no shortness of breath. Her past medical history is significant for asthma. She requests asthma medication refill     Asthma controlled until she stopped all medications for 1 week prior to allergy testing   Saw Allergist this morning   She is allergic to dust mite, cats, dogs and cockroach    Past Medical History:   Diagnosis Date    Asthma     Menopause         Review of Systems   Constitutional: Negative. Respiratory:  Positive for chest tightness and wheezing. Negative for shortness of breath. Neurological: Negative. Objective:   Physical Exam  Constitutional:       General: She is not in acute distress. Appearance: Normal appearance. She is not ill-appearing. Pulmonary:      Comments: She speaks in long clear sentences; no conversational dyspnea  Neurological:      Mental Status: She is alert and oriented to person, place, and time. Mental status is at baseline. Psychiatric:         Mood and Affect: Mood normal.         Behavior: Behavior normal.         Thought Content: Thought content normal.       Assessment:      Deborah Yusuf was seen today for follow-up and asthma. Diagnoses and all orders for this visit:    Moderate persistent asthma with acute exacerbation  -     fluticasone (FLOVENT HFA) 110 MCG/ACT inhaler;  Inhale 2 puffs

## 2023-08-12 DIAGNOSIS — T78.40XA ALLERGY, INITIAL ENCOUNTER: ICD-10-CM

## 2023-08-14 RX ORDER — AZELASTINE HYDROCHLORIDE 137 UG/1
SPRAY, METERED NASAL
Refills: 1 | OUTPATIENT
Start: 2023-08-14

## 2023-08-15 ENCOUNTER — TELEPHONE (OUTPATIENT)
Age: 43
End: 2023-08-15

## 2023-08-15 NOTE — TELEPHONE ENCOUNTER
Spoke with patient, She does not want to continue to be a patient of Dr. Farhat Calderón and advised me to cancel upcoming appointment. Advised to go to [address] [place] [address] or ER for any worsening symptoms of depression. Pt agreed and voiced understanding. Narayan Hampton LPN

## 2023-08-15 NOTE — TELEPHONE ENCOUNTER
Pt unable to reg body temp or emotions; also past due for physical, but next avail phys appt too far out/pt unavail (9/28/23). Last phys 3/09/2022. Scheduled OV for 9/14/23, but pt requests a call back re: depression, and a sooner appt, and/or physical appt prior to 9/28/23 if possible.  [facility]# [phone]

## 2023-10-01 SDOH — HEALTH STABILITY: PHYSICAL HEALTH: ON AVERAGE, HOW MANY MINUTES DO YOU ENGAGE IN EXERCISE AT THIS LEVEL?: 20 MIN

## 2023-10-01 SDOH — HEALTH STABILITY: PHYSICAL HEALTH: ON AVERAGE, HOW MANY DAYS PER WEEK DO YOU ENGAGE IN MODERATE TO STRENUOUS EXERCISE (LIKE A BRISK WALK)?: 2 DAYS

## 2023-10-01 ASSESSMENT — SOCIAL DETERMINANTS OF HEALTH (SDOH)
WITHIN THE LAST YEAR, HAVE YOU BEEN KICKED, HIT, SLAPPED, OR OTHERWISE PHYSICALLY HURT BY YOUR PARTNER OR EX-PARTNER?: NO
WITHIN THE LAST YEAR, HAVE YOU BEEN AFRAID OF YOUR PARTNER OR EX-PARTNER?: NO
WITHIN THE LAST YEAR, HAVE TO BEEN RAPED OR FORCED TO HAVE ANY KIND OF SEXUAL ACTIVITY BY YOUR PARTNER OR EX-PARTNER?: NO
WITHIN THE LAST YEAR, HAVE YOU BEEN HUMILIATED OR EMOTIONALLY ABUSED IN OTHER WAYS BY YOUR PARTNER OR EX-PARTNER?: NO

## 2023-10-04 ENCOUNTER — OFFICE VISIT (OUTPATIENT)
Age: 43
End: 2023-10-04
Payer: COMMERCIAL

## 2023-10-04 VITALS
SYSTOLIC BLOOD PRESSURE: 124 MMHG | HEART RATE: 84 BPM | WEIGHT: 270.2 LBS | OXYGEN SATURATION: 94 % | HEIGHT: 68 IN | TEMPERATURE: 98.3 F | RESPIRATION RATE: 18 BRPM | DIASTOLIC BLOOD PRESSURE: 84 MMHG | BODY MASS INDEX: 40.95 KG/M2

## 2023-10-04 DIAGNOSIS — G43.109 MIGRAINE WITH AURA AND WITHOUT STATUS MIGRAINOSUS, NOT INTRACTABLE: ICD-10-CM

## 2023-10-04 DIAGNOSIS — Z76.89 ENCOUNTER TO ESTABLISH CARE: ICD-10-CM

## 2023-10-04 DIAGNOSIS — F33.0 MILD EPISODE OF RECURRENT MAJOR DEPRESSIVE DISORDER (HCC): ICD-10-CM

## 2023-10-04 DIAGNOSIS — R53.83 OTHER FATIGUE: ICD-10-CM

## 2023-10-04 DIAGNOSIS — Z00.00 ADULT GENERAL MEDICAL EXAM: Primary | ICD-10-CM

## 2023-10-04 PROCEDURE — 99204 OFFICE O/P NEW MOD 45 MIN: CPT | Performed by: STUDENT IN AN ORGANIZED HEALTH CARE EDUCATION/TRAINING PROGRAM

## 2023-10-04 RX ORDER — VENLAFAXINE HYDROCHLORIDE 37.5 MG/1
37.5 CAPSULE, EXTENDED RELEASE ORAL DAILY
Qty: 90 CAPSULE | Refills: 0 | Status: SHIPPED | OUTPATIENT
Start: 2023-10-04 | End: 2024-01-02

## 2023-10-04 SDOH — ECONOMIC STABILITY: INCOME INSECURITY: HOW HARD IS IT FOR YOU TO PAY FOR THE VERY BASICS LIKE FOOD, HOUSING, MEDICAL CARE, AND HEATING?: SOMEWHAT HARD

## 2023-10-04 SDOH — ECONOMIC STABILITY: FOOD INSECURITY: WITHIN THE PAST 12 MONTHS, YOU WORRIED THAT YOUR FOOD WOULD RUN OUT BEFORE YOU GOT MONEY TO BUY MORE.: NEVER TRUE

## 2023-10-04 SDOH — ECONOMIC STABILITY: FOOD INSECURITY: WITHIN THE PAST 12 MONTHS, THE FOOD YOU BOUGHT JUST DIDN'T LAST AND YOU DIDN'T HAVE MONEY TO GET MORE.: NEVER TRUE

## 2023-10-04 ASSESSMENT — ENCOUNTER SYMPTOMS
EYE PAIN: 0
SHORTNESS OF BREATH: 0
NAUSEA: 0
VOMITING: 0
RHINORRHEA: 0
CHEST TIGHTNESS: 0
ABDOMINAL PAIN: 0
WHEEZING: 0
DIARRHEA: 0
COLOR CHANGE: 0
CONSTIPATION: 0
BACK PAIN: 0
COUGH: 0

## 2023-10-04 ASSESSMENT — PATIENT HEALTH QUESTIONNAIRE - PHQ9
2. FEELING DOWN, DEPRESSED OR HOPELESS: 1
SUM OF ALL RESPONSES TO PHQ QUESTIONS 1-9: 2
SUM OF ALL RESPONSES TO PHQ9 QUESTIONS 1 & 2: 2
SUM OF ALL RESPONSES TO PHQ QUESTIONS 1-9: 2
SUM OF ALL RESPONSES TO PHQ QUESTIONS 1-9: 2
1. LITTLE INTEREST OR PLEASURE IN DOING THINGS: 1
SUM OF ALL RESPONSES TO PHQ QUESTIONS 1-9: 2

## 2023-10-04 NOTE — PROGRESS NOTES
NP   budesonide-formoterol (SYMBICORT) 160-4.5 MCG/ACT AERO Inhale 2 puffs into the lungs 2 times daily 23  Yes Bernard Henrying, APRN - NP   naproxen (NAPROSYN) 250 MG tablet Take 1 tablet by mouth as needed   Yes Automatic Reconciliation, Ar   fluticasone (FLOVENT HFA) 110 MCG/ACT inhaler Inhale 2 puffs into the lungs 2 times daily  Patient not taking: Reported on 10/4/2023 6/8/23 6/7/24  Bernard Wakefield, APRN - NP   levalbuterol Jhonnie Markham) 0.31 MG/3ML nebulization Take 3 mLs by nebulization every 4 hours as needed for Wheezing  Patient not taking: Reported on 10/4/2023 5/27/23   Bernard Wakefield, APRN - NP   fluticasone-salmeterol (ADVAIR DISKUS) 250-50 MCG/ACT AEPB diskus inhaler Inhale 1 puff into the lungs in the morning and 1 puff in the evening. Patient not taking: Reported on 10/4/2023 5/27/23   Bernard Wakefield, APRN - NP   Fexofenadine-Pseudoephedrine (ALLEGRA-D 24 HOUR PO) Take by mouth  Patient not taking: Reported on 10/4/2023    Provider, Historical, MD       Surgical History    Past Surgical History:   Procedure Laterality Date    HYSTERECTOMY (CERVIX STATUS UNKNOWN)         Family History    Family History   Problem Relation Age of Onset    Diabetes Mother     High Cholesterol Mother     Fibromyalgia Mother     Diabetes Maternal Grandfather     High Cholesterol Maternal Grandfather         Social History    Social History     Occupational History    Not on file   Tobacco Use    Smoking status: Former     Packs/day: 0.50     Years: 3.00     Additional pack years: 0.00     Total pack years: 1.50     Types: Cigarettes     Quit date: 2015     Years since quittin.7    Smokeless tobacco: Never   Substance and Sexual Activity    Alcohol use:  Yes     Alcohol/week: 1.0 standard drink of alcohol     Types: 1 Glasses of wine per week    Drug use: Never    Sexual activity: Not on file        The following sections were reviewed & updated as appropriate: Problem List, Allergies, PMH, PSH, FH, and

## 2023-10-05 LAB
T4 FREE SERPL-MCNC: 0.8 NG/DL (ref 0.8–1.5)
TSH SERPL DL<=0.05 MIU/L-ACNC: 2.88 UIU/ML (ref 0.36–3.74)

## 2023-11-28 ENCOUNTER — TELEPHONE (OUTPATIENT)
Age: 43
End: 2023-11-28

## 2023-11-28 NOTE — TELEPHONE ENCOUNTER
Received message from patient. Ok to schedule virtual appointment for follow-up? Samantha Mckoy  [facility]  Staff 4 minutes ago (11:33 AM)     NF  Appointment Request From: Samantha Mckoy     With Provider: Lizz Kohli DO [[facility]]     Preferred Date Range: 11/29/2023 - 12/15/2023     Preferred Times: Any Time     Reason for visit: Request an Appointment     Comments:   Follow up on meds/ prefer phone follow up

## 2023-12-08 ENCOUNTER — TELEMEDICINE (OUTPATIENT)
Age: 43
End: 2023-12-08
Payer: COMMERCIAL

## 2023-12-08 DIAGNOSIS — F33.0 MILD EPISODE OF RECURRENT MAJOR DEPRESSIVE DISORDER (HCC): ICD-10-CM

## 2023-12-08 PROCEDURE — 99214 OFFICE O/P EST MOD 30 MIN: CPT | Performed by: STUDENT IN AN ORGANIZED HEALTH CARE EDUCATION/TRAINING PROGRAM

## 2023-12-08 RX ORDER — VENLAFAXINE HYDROCHLORIDE 37.5 MG/1
37.5 CAPSULE, EXTENDED RELEASE ORAL DAILY
Qty: 90 CAPSULE | Refills: 3 | Status: SHIPPED | OUTPATIENT
Start: 2023-12-08 | End: 2024-12-02

## 2023-12-08 ASSESSMENT — PATIENT HEALTH QUESTIONNAIRE - PHQ9
SUM OF ALL RESPONSES TO PHQ QUESTIONS 1-9: 1
10. IF YOU CHECKED OFF ANY PROBLEMS, HOW DIFFICULT HAVE THESE PROBLEMS MADE IT FOR YOU TO DO YOUR WORK, TAKE CARE OF THINGS AT HOME, OR GET ALONG WITH OTHER PEOPLE: 1
8. MOVING OR SPEAKING SO SLOWLY THAT OTHER PEOPLE COULD HAVE NOTICED. OR THE OPPOSITE, BEING SO FIGETY OR RESTLESS THAT YOU HAVE BEEN MOVING AROUND A LOT MORE THAN USUAL: 1
4. FEELING TIRED OR HAVING LITTLE ENERGY: 3
SUM OF ALL RESPONSES TO PHQ QUESTIONS 1-9: 1
9. THOUGHTS THAT YOU WOULD BE BETTER OFF DEAD, OR OF HURTING YOURSELF: 0
SUM OF ALL RESPONSES TO PHQ QUESTIONS 1-9: 6
SUM OF ALL RESPONSES TO PHQ QUESTIONS 1-9: 6
2. FEELING DOWN, DEPRESSED OR HOPELESS: 0
SUM OF ALL RESPONSES TO PHQ QUESTIONS 1-9: 6
7. TROUBLE CONCENTRATING ON THINGS, SUCH AS READING THE NEWSPAPER OR WATCHING TELEVISION: 1
SUM OF ALL RESPONSES TO PHQ QUESTIONS 1-9: 1
SUM OF ALL RESPONSES TO PHQ QUESTIONS 1-9: 6
5. POOR APPETITE OR OVEREATING: 0
SUM OF ALL RESPONSES TO PHQ QUESTIONS 1-9: 1
SUM OF ALL RESPONSES TO PHQ9 QUESTIONS 1 & 2: 0
6. FEELING BAD ABOUT YOURSELF - OR THAT YOU ARE A FAILURE OR HAVE LET YOURSELF OR YOUR FAMILY DOWN: 1
1. LITTLE INTEREST OR PLEASURE IN DOING THINGS: 1
1. LITTLE INTEREST OR PLEASURE IN DOING THINGS: 0
3. TROUBLE FALLING OR STAYING ASLEEP: 0

## 2023-12-08 ASSESSMENT — COLUMBIA-SUICIDE SEVERITY RATING SCALE - C-SSRS
7. DID THIS OCCUR IN THE LAST THREE MONTHS: NO
3. HAVE YOU BEEN THINKING ABOUT HOW YOU MIGHT KILL YOURSELF?: NO
5. HAVE YOU STARTED TO WORK OUT OR WORKED OUT THE DETAILS OF HOW TO KILL YOURSELF? DO YOU INTEND TO CARRY OUT THIS PLAN?: NO
4. HAVE YOU HAD THESE THOUGHTS AND HAD SOME INTENTION OF ACTING ON THEM?: NO

## 2023-12-08 NOTE — PROGRESS NOTES
Con Mcintosh is a 41 y.o. female who was seen by synchronous (real-time) audio-video technology on 12/8/2023. Con Mcintosh, was evaluated through a synchronous (real-time) audio-video encounter. The patient (or guardian if applicable) is aware that this is a billable service, which includes applicable co-pays. This Virtual Visit was conducted with patient's (and/or legal guardian's) consent. Patient identification was verified, and a caregiver was present when appropriate. The patient was located at Home: [address] [place] [address]  Provider was located at [facility] ([address] [place], [place] [address]t): [address] [facility],  [address]        Subjective:   Con Mcintosh was seen for Follow-up (Pt stated that she was started on new medication [facility]) & follow up. [address])    At her last visit we started venlafaxine for mood lability and hot flashes. She says that her hot flashes are still present, but her mood is very much improved. She says that she does not have as many drastic ups and downs and has been feeling much better. She says that she has had some daytime sleepiness since starting the effexor but not sure that they are related. She says that she has also had a couple of panic attacks recently, related to her in-laws moving in with her and her . She denies any other new complaints today. Past Medical History:   Diagnosis Date    Asthma     Menopause     Migraine        Prior to Admission medications    Medication Sig Start Date End Date Taking?  Authorizing Provider   venlafaxine (EFFEXOR XR) 37.5 MG extended release capsule Take 1 capsule by mouth daily 12/8/23 12/2/24 Yes Melissa Gifford, DO   albuterol sulfate HFA (PROVENTIL;VENTOLIN;PROAIR) 108 (90 Base) MCG/ACT inhaler Inhale 2 puffs into the lungs every 4 hours as needed for Wheezing or Shortness of Breath 5/22/23  Yes DAVID Hebert NP   montelukast (SINGULAIR) 10 MG tablet Take 1 tablet by mouth

## 2024-01-11 ENCOUNTER — PATIENT MESSAGE (OUTPATIENT)
Age: 44
End: 2024-01-11

## 2024-01-11 DIAGNOSIS — J45.41 MODERATE PERSISTENT ASTHMA WITH ACUTE EXACERBATION: ICD-10-CM

## 2024-01-11 RX ORDER — MONTELUKAST SODIUM 10 MG/1
10 TABLET ORAL DAILY
Qty: 90 TABLET | Refills: 3 | Status: SHIPPED | OUTPATIENT
Start: 2024-01-11 | End: 2025-01-05

## 2024-01-11 NOTE — TELEPHONE ENCOUNTER
From: Martha Pelaez  To: Dr. Don Hernandez  Sent: 1/11/2024 8:48 AM EST  Subject: Medication refill approval    Good Morning,     I am in need of refilling my montelukast 10 MG tablet and the pharmacy says they are awaiting an approval. Is there something that I need to do to get an approval for refills on this medication.     Also, I have been experiencing some more anxiety/ panic attacks with stress and am working to find a therapist but have been unsuccessful. Are there any suggestion that can help me with these attacks?     Thank you for your time, I appreciate you.     Martha Pelaez  [phone]

## 2024-01-25 ENCOUNTER — TELEMEDICINE (OUTPATIENT)
Age: 44
End: 2024-01-25
Payer: COMMERCIAL

## 2024-01-25 DIAGNOSIS — F33.1 MAJOR DEPRESSIVE DISORDER, RECURRENT EPISODE, MODERATE (HCC): Primary | ICD-10-CM

## 2024-01-25 PROCEDURE — 90791 PSYCH DIAGNOSTIC EVALUATION: CPT | Performed by: SOCIAL WORKER

## 2024-01-25 NOTE — PROGRESS NOTES
Virtual Visit (At Home) -Initial Evaluation    Time Start:9:00 am  Time End:10:02 am    DX:    Diagnosis Orders   1. Major depressive disorder, recurrent episode, moderate (HCC)                 Met with Ms. Pelaez 1/25/2024 for our first appointment.  This patient was referred by her PCP due to increased symptoms of depression and anxiety.  The patient is also identifying panic attacks that are lasting \"up to 2 days.\" She is unaware of what is triggering these attacks but the more she shares about her past and things that are causing her stress today, it seems she is holding a lot of her feelings in.  Ms. Pelaez is in a relationship for the past 4 years.  She has been  x2 and had her first child at age 20.  She got  to her first , primarily because she got pregnant.  She had both her daughter and son with this .  Her children are 23 and 22.  She was  for 4 1/2 yearsand realized that her , like her parents, was very controlling.  She got  to her second  in 2010.  They had known each other when they were in grade school together.  They  and  in 2017 after she realized he was into substances and she did not like the way he tried to parent her children.  Her parents were  until the patient was 12 years old and then they .  Her father and one of the sisters live in [place] while her mother and 2 sisters live in [place] where the patient was born and stayed until she moved here.  The patient moved to [place] with her current partner where he attends school and she works and has recently received her bachelor's degree in [employer].  Her goal is to receive her master's as well in [employer] law.  Currently she is working full time as a  for [employer].      Martha Pelaez is a 43 y.o. female who is alert and oriented X3.  she  does not have any suicidal or homicidal ideations.  Patient is not psychotic or delusional.   she

## 2024-02-01 ENCOUNTER — TELEMEDICINE (OUTPATIENT)
Age: 44
End: 2024-02-01
Payer: COMMERCIAL

## 2024-02-01 DIAGNOSIS — F33.1 MAJOR DEPRESSIVE DISORDER, RECURRENT EPISODE, MODERATE (HCC): Primary | ICD-10-CM

## 2024-02-01 PROCEDURE — 90837 PSYTX W PT 60 MINUTES: CPT | Performed by: SOCIAL WORKER
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
## 2024-02-07 ENCOUNTER — TELEPHONE (OUTPATIENT)
Age: 44
End: 2024-02-07

## 2024-02-07 NOTE — TELEPHONE ENCOUNTER
----- Message from Sanju Elliott sent at 2/7/2024  4:09 PM EST -----  Subject: Message to Provider    QUESTIONS  Information for Provider? Pt is calling in to cancel her appt with Isabella   Elko on 2/8/2024 at 11:00am .  ---------------------------------------------------------------------------  --------------  CALL BACK INFO  [phone]; OK to leave message on voicemail  ---------------------------------------------------------------------------  --------------  SCRIPT ANSWERS  Relationship to Patient? Self

## 2024-04-09 ENCOUNTER — OFFICE VISIT (OUTPATIENT)
Age: 44
End: 2024-04-09
Payer: COMMERCIAL

## 2024-04-09 ENCOUNTER — HOSPITAL ENCOUNTER (OUTPATIENT)
Facility: HOSPITAL | Age: 44
Discharge: HOME | End: 2024-04-12
Payer: COMMERCIAL

## 2024-04-09 VITALS — HEIGHT: 70 IN | WEIGHT: 260 LBS | BODY MASS INDEX: 37.22 KG/M2

## 2024-04-09 VITALS
WEIGHT: 260 LBS | OXYGEN SATURATION: 98 % | DIASTOLIC BLOOD PRESSURE: 78 MMHG | SYSTOLIC BLOOD PRESSURE: 138 MMHG | BODY MASS INDEX: 37.22 KG/M2 | HEIGHT: 70 IN | HEART RATE: 83 BPM

## 2024-04-09 DIAGNOSIS — Z87.898 HISTORY OF SNORING: ICD-10-CM

## 2024-04-09 DIAGNOSIS — G43.109 MIGRAINE WITH AURA AND WITHOUT STATUS MIGRAINOSUS, NOT INTRACTABLE: Primary | ICD-10-CM

## 2024-04-09 DIAGNOSIS — Z12.31 VISIT FOR SCREENING MAMMOGRAM: ICD-10-CM

## 2024-04-09 PROCEDURE — 77063 BREAST TOMOSYNTHESIS BI: CPT

## 2024-04-09 PROCEDURE — 99204 OFFICE O/P NEW MOD 45 MIN: CPT | Performed by: PSYCHIATRY & NEUROLOGY

## 2024-04-09 RX ORDER — RIZATRIPTAN BENZOATE 10 MG/1
10 TABLET ORAL PRN
Qty: 9 TABLET | Refills: 5 | Status: SHIPPED | OUTPATIENT
Start: 2024-04-09

## 2024-04-09 RX ORDER — TOPIRAMATE 50 MG/1
50 TABLET, FILM COATED ORAL
Qty: 90 TABLET | Refills: 1 | Status: SHIPPED | OUTPATIENT
Start: 2024-04-09

## 2024-04-09 NOTE — PROGRESS NOTES
medications and potential side effects.   Discussed medication overuse headache and to limit use of analgesics to less than 2 doses per week.     PLAN:  Sleep medicine referral for [facility] evaluation  Start TPM 50mg qhs for migraine prophylaxis   Trial of Maxalt 10mg PRN for migraine rescue therapy     Return in about 5 months (around 9/9/2024).    I have discussed the diagnosis with the patient today and the intended plan as seen in the above orders with both the patient as well as referring provider and/or PCP via electronic correspondence. The patient has received an after-visit summary and questions were answered concerning future plans. I have discussed medication side effects and warnings with the patient as well.      Ai Funez DO  Staff Neurologist  Diplomate, American Board of Psychiatry & Neurology       CC Referring provider:    Don Hernandez DO

## 2024-04-26 ENCOUNTER — PATIENT MESSAGE (OUTPATIENT)
Age: 44
End: 2024-04-26

## 2024-04-30 ENCOUNTER — TELEMEDICINE (OUTPATIENT)
Age: 44
End: 2024-04-30
Payer: COMMERCIAL

## 2024-04-30 DIAGNOSIS — E66.09 CLASS 2 OBESITY DUE TO EXCESS CALORIES WITHOUT SERIOUS COMORBIDITY WITH BODY MASS INDEX (BMI) OF 37.0 TO 37.9 IN ADULT: Primary | ICD-10-CM

## 2024-04-30 PROBLEM — E66.812 CLASS 2 OBESITY DUE TO EXCESS CALORIES WITHOUT SERIOUS COMORBIDITY WITH BODY MASS INDEX (BMI) OF 37.0 TO 37.9 IN ADULT: Status: ACTIVE | Noted: 2024-04-30

## 2024-04-30 PROCEDURE — 99214 OFFICE O/P EST MOD 30 MIN: CPT | Performed by: STUDENT IN AN ORGANIZED HEALTH CARE EDUCATION/TRAINING PROGRAM

## 2024-04-30 RX ORDER — SEMAGLUTIDE 0.5 MG/.5ML
0.5 INJECTION, SOLUTION SUBCUTANEOUS
Qty: 2 ML | Refills: 0 | Status: SHIPPED | OUTPATIENT
Start: 2024-05-21

## 2024-04-30 RX ORDER — SEMAGLUTIDE 0.25 MG/.5ML
0.25 INJECTION, SOLUTION SUBCUTANEOUS
Qty: 2 ML | Refills: 0 | Status: SHIPPED | OUTPATIENT
Start: 2024-04-30

## 2024-04-30 ASSESSMENT — ENCOUNTER SYMPTOMS
SHORTNESS OF BREATH: 0
NAUSEA: 0
DIARRHEA: 0
COUGH: 0
ABDOMINAL PAIN: 0
VOMITING: 0
CONSTIPATION: 0
BLOOD IN STOOL: 0

## 2024-04-30 NOTE — ASSESSMENT & PLAN NOTE
Will start Wegovy - she has checked with her insurance and was told that it is on her formulary. She is also planning to continue working on her diet and as she loses weight and her sciatic pain improves, she also plans to start exercising more.

## 2024-04-30 NOTE — PROGRESS NOTES
Martha Pelaez is a 44 y.o. female who was seen by synchronous (real-time) audio-video technology on 4/30/2024.      Martha Pelaez, was evaluated through a synchronous (real-time) audio-video encounter. The patient (or guardian if applicable) is aware that this is a billable service, which includes applicable co-pays. This Virtual Visit was conducted with patient's (and/or legal guardian's) consent. Patient identification was verified, and a caregiver was present when appropriate.   The patient was located at Other: Work  Provider was located at Facility (Appt Dept): [address] [place] [address]  Confirm you are appropriately licensed, registered, or certified to deliver care in the state where the patient is located as indicated above. If you are not or unsure, please re-schedule the visit: Yes, I confirm.       Subjective:   Martha Pelaez was seen for Weight Loss    She has gained over 70 lbs since 2020. She is struggling with sciatic nerve pain and this is making it harder to exercise and move.   Diet: she was generally a healthy eater before COVID but due to some personal circumstances/caring for family she found herself eating out more, eating a lot of convenience foods. She has been working on eating healthier, avoids sweets and fast foods.   She has considered bariatric surgery, but would like to avoid that, and is interested in trying medication for weight loss.     Past Medical History:   Diagnosis Date    Asthma     Menopause     Migraine        Prior to Admission medications    Medication Sig Start Date End Date Taking? Authorizing Provider   Semaglutide-Weight Management (WEGOVY) 0.25 MG/0.5ML SOAJ SC injection Inject 0.25 mg into the skin every 7 days 4/30/24  Yes Don Hernandez, DO   Semaglutide-Weight Management (WEGOVY) 0.5 MG/0.5ML SOAJ SC injection Inject 0.5 mg into the skin every 7 days 5/21/24  Yes Don Hernandez, DO   NONFORMULARY Calcium with D3, Glucosamine,

## 2024-06-17 ENCOUNTER — TELEMEDICINE (OUTPATIENT)
Age: 44
End: 2024-06-17
Payer: COMMERCIAL

## 2024-06-17 DIAGNOSIS — E66.09 CLASS 2 OBESITY DUE TO EXCESS CALORIES WITHOUT SERIOUS COMORBIDITY WITH BODY MASS INDEX (BMI) OF 37.0 TO 37.9 IN ADULT: Primary | ICD-10-CM

## 2024-06-17 PROCEDURE — 99214 OFFICE O/P EST MOD 30 MIN: CPT | Performed by: STUDENT IN AN ORGANIZED HEALTH CARE EDUCATION/TRAINING PROGRAM

## 2024-06-17 RX ORDER — SEMAGLUTIDE 1 MG/.5ML
1 INJECTION, SOLUTION SUBCUTANEOUS
Qty: 2 ML | Refills: 3 | Status: SHIPPED | OUTPATIENT
Start: 2024-06-17

## 2024-06-17 ASSESSMENT — PATIENT HEALTH QUESTIONNAIRE - PHQ9
4. FEELING TIRED OR HAVING LITTLE ENERGY: NOT AT ALL
2. FEELING DOWN, DEPRESSED OR HOPELESS: NOT AT ALL
SUM OF ALL RESPONSES TO PHQ QUESTIONS 1-9: 0
SUM OF ALL RESPONSES TO PHQ9 QUESTIONS 1 & 2: 0
3. TROUBLE FALLING OR STAYING ASLEEP: NOT AT ALL
SUM OF ALL RESPONSES TO PHQ QUESTIONS 1-9: 0
9. THOUGHTS THAT YOU WOULD BE BETTER OFF DEAD, OR OF HURTING YOURSELF: NOT AT ALL
5. POOR APPETITE OR OVEREATING: NOT AT ALL
1. LITTLE INTEREST OR PLEASURE IN DOING THINGS: NOT AT ALL
SUM OF ALL RESPONSES TO PHQ QUESTIONS 1-9: 0
7. TROUBLE CONCENTRATING ON THINGS, SUCH AS READING THE NEWSPAPER OR WATCHING TELEVISION: NOT AT ALL
8. MOVING OR SPEAKING SO SLOWLY THAT OTHER PEOPLE COULD HAVE NOTICED. OR THE OPPOSITE, BEING SO FIGETY OR RESTLESS THAT YOU HAVE BEEN MOVING AROUND A LOT MORE THAN USUAL: NOT AT ALL
SUM OF ALL RESPONSES TO PHQ QUESTIONS 1-9: 0
6. FEELING BAD ABOUT YOURSELF - OR THAT YOU ARE A FAILURE OR HAVE LET YOURSELF OR YOUR FAMILY DOWN: NOT AT ALL
10. IF YOU CHECKED OFF ANY PROBLEMS, HOW DIFFICULT HAVE THESE PROBLEMS MADE IT FOR YOU TO DO YOUR WORK, TAKE CARE OF THINGS AT HOME, OR GET ALONG WITH OTHER PEOPLE: NOT DIFFICULT AT ALL

## 2024-06-17 NOTE — PROGRESS NOTES
Verified name and birth date for privacy precautions.   Chart reviewed in preparation for today's visit.     Chief Complaint   Patient presents with    Weight Management          Health Maintenance Due   Topic    Hepatitis B vaccine (1 of 3 - 3-dose series)    HIV screen     Pneumococcal 0-64 years Vaccine (2 of 2 - PCV)    DTaP/Tdap/Td vaccine (3 - Td or Tdap)    COVID-19 Vaccine (6 - 2023-24 season)         Wt Readings from Last 3 Encounters:   04/09/24 117.9 kg (260 lb)   04/09/24 117.9 kg (260 lb)   10/04/23 122.6 kg (270 lb 3.2 oz)     Temp Readings from Last 3 Encounters:   10/04/23 98.3 °F (36.8 °C) (Oral)   05/22/23 98.5 °F (36.9 °C) (Oral)     BP Readings from Last 3 Encounters:   04/09/24 138/78   10/04/23 124/84   05/22/23 120/79     Pulse Readings from Last 3 Encounters:   04/09/24 83   10/04/23 84   05/22/23 68       Social Determinants of Health     Tobacco Use: Medium Risk (4/9/2024)    Patient History     Smoking Tobacco Use: Former     Smokeless Tobacco Use: Never     Passive Exposure: Not on file   Alcohol Use: Not on file   Financial Resource Strain: Medium Risk (10/4/2023)    Overall Financial Resource Strain (CARDIA)     Difficulty of Paying Living Expenses: Somewhat hard   Food Insecurity: Not on file (10/4/2023)   Transportation Needs: Unknown (10/4/2023)    PRAPARE - Transportation     Lack of Transportation (Medical): Not on file     Lack of Transportation (Non-Medical): No   Physical Activity: Insufficiently Active (10/1/2023)    Exercise Vital Sign     Days of Exercise per Week: 2 days     Minutes of Exercise per Session: 20 min   Stress: Not on file   Social Connections: Not on file   Intimate Partner Violence: Not At Risk (10/1/2023)    Humiliation, Afraid, Rape, and Kick questionnaire     Fear of Current or Ex-Partner: No     Emotionally Abused: No     Physically Abused: No     Sexually Abused: No   Depression: At risk (12/8/2023)    PHQ-2     PHQ-2 Score: 6   Housing Stability: Unknown

## 2024-06-17 NOTE — PROGRESS NOTES
Martha Pelaez is a 44 y.o. female who was seen by synchronous (real-time) audio-video technology on 6/17/2024.      Martha Pelaez, was evaluated through a synchronous (real-time) audio-video encounter. The patient (or guardian if applicable) is aware that this is a billable service, which includes applicable co-pays. This Virtual Visit was conducted with patient's (and/or legal guardian's) consent. Patient identification was verified, and a caregiver was present when appropriate.   The patient was located at Home: [address] [place] [address] [place] [address]  Provider was located at Facility (Appt Dept): [address] [place] [address] [address]  [place] [address]  Confirm you are appropriately licensed, registered, or certified to deliver care in the state where the patient is located as indicated above. If you are not or unsure, please re-schedule the visit: Yes, I confirm.       Subjective:   Martha Pelaez was seen for Weight Management    She has lost 5.25 lbs on her home scale   Starting weight was 274.25, last Tuesday she was 269  Also reports since starting Wegovy she has been able to be more active with less pain - she has had neuropathic pain in her back for several years but went on a trip to [facility] and was able to walk 17k steps per day without back pain.      Past Medical History:   Diagnosis Date    Asthma     Menopause     Migraine        Prior to Admission medications    Medication Sig Start Date End Date Taking? Authorizing Provider   Semaglutide-Weight Management (WEGOVY) 1 MG/0.5ML SOAJ SC injection Inject 1 mg into the skin every 7 days 6/17/24  Yes Don Hernandez, DO   NONFORMULARY Calcium with D3, Glucosamine, Lysine   Yes Provider, MD Alexandro   rizatriptan (MAXALT) 10 MG tablet Take 1 tablet by mouth as needed for Migraine May repeat in 2 hours if needed 4/9/24  Yes Ai Funez, DO   topiramate (TOPAMAX) 50 MG tablet Take 1 tablet by mouth nightly 4/9/24  Yes Ai Funez, DO

## 2024-06-17 NOTE — ASSESSMENT & PLAN NOTE
Doing well increasing exercise. Will increase Wegovy to 1mg/week. She will follow up in person in 4 months for her CPE

## 2024-07-19 ENCOUNTER — OFFICE VISIT (OUTPATIENT)
Age: 44
End: 2024-07-19
Payer: COMMERCIAL

## 2024-07-19 VITALS
WEIGHT: 259 LBS | SYSTOLIC BLOOD PRESSURE: 116 MMHG | TEMPERATURE: 97.7 F | DIASTOLIC BLOOD PRESSURE: 76 MMHG | RESPIRATION RATE: 16 BRPM | BODY MASS INDEX: 37.08 KG/M2 | HEIGHT: 70 IN | HEART RATE: 73 BPM | OXYGEN SATURATION: 95 %

## 2024-07-19 DIAGNOSIS — N30.01 ACUTE CYSTITIS WITH HEMATURIA: ICD-10-CM

## 2024-07-19 DIAGNOSIS — R30.9 PAINFUL URINATION: Primary | ICD-10-CM

## 2024-07-19 LAB
BILIRUBIN, URINE, POC: NORMAL
BLOOD URINE, POC: NORMAL
GLUCOSE URINE, POC: NEGATIVE
KETONES, URINE, POC: NEGATIVE
LEUKOCYTE ESTERASE, URINE, POC: NORMAL
NITRITE, URINE, POC: NEGATIVE
PH, URINE, POC: 6 (ref 4.6–8)
PROTEIN,URINE, POC: NEGATIVE
SPECIFIC GRAVITY, URINE, POC: 1.02 (ref 1–1.03)
URINALYSIS CLARITY, POC: CLEAR
URINALYSIS COLOR, POC: NORMAL
UROBILINOGEN, POC: NORMAL

## 2024-07-19 PROCEDURE — 81003 URINALYSIS AUTO W/O SCOPE: CPT | Performed by: STUDENT IN AN ORGANIZED HEALTH CARE EDUCATION/TRAINING PROGRAM

## 2024-07-19 PROCEDURE — 99213 OFFICE O/P EST LOW 20 MIN: CPT | Performed by: STUDENT IN AN ORGANIZED HEALTH CARE EDUCATION/TRAINING PROGRAM

## 2024-07-19 RX ORDER — NITROFURANTOIN 25; 75 MG/1; MG/1
100 CAPSULE ORAL 2 TIMES DAILY
Qty: 10 CAPSULE | Refills: 0 | Status: SHIPPED | OUTPATIENT
Start: 2024-07-19 | End: 2024-07-24

## 2024-07-19 NOTE — PROGRESS NOTES
Martha Pelaez (: 1980) is a 44 y.o. female patient here for evaluation of the following chief complaint(s):  Dysuria (/)       Subjective:   Urinary Tract Infection  This is a new problem. The current episode started in the past 7 days. The problem is unchanged. Associated symptoms include pain. Pertinent negatives include no hematuria. The pain is present in the urethra.         Review of Systems   Constitutional:  Negative for chills and fever.   Genitourinary:  Positive for frequency and urgency. Negative for flank pain and hematuria.         PMHx:  Patient Active Problem List   Diagnosis    Mild episode of recurrent major depressive disorder (HCC)    Migraine with aura and without status migrainosus, not intractable    Class 2 obesity due to excess calories without serious comorbidity with body mass index (BMI) of 37.0 to 37.9 in adult       Prior to Admission medications    Medication Sig Start Date End Date Taking? Authorizing Provider   nitrofurantoin, macrocrystal-monohydrate, (MACROBID) 100 MG capsule Take 1 capsule by mouth 2 times daily for 5 days 24 Yes Don Hernandez DO   Semaglutide-Weight Management (WEGOVY) 1 MG/0.5ML SOAJ SC injection Inject 1 mg into the skin every 7 days 24  Yes Don Hernandez DO   NONFORMULARY Calcium with D3, Glucosamine, Lysine   Yes Provider, MD Alexandro   rizatriptan (MAXALT) 10 MG tablet Take 1 tablet by mouth as needed for Migraine May repeat in 2 hours if needed 24  Yes Ai Funez DO   topiramate (TOPAMAX) 50 MG tablet Take 1 tablet by mouth nightly 24  Yes Ai Funez DO   montelukast (SINGULAIR) 10 MG tablet Take 1 tablet by mouth daily 24 Yes Don Hernandez DO   venlafaxine (EFFEXOR XR) 37.5 MG extended release capsule Take 1 capsule by mouth daily 23 Yes Don Hernandez DO   albuterol sulfate HFA (PROVENTIL;VENTOLIN;PROAIR) 108 (90 Base) MCG/ACT inhaler Inhale 2 puffs into the

## 2024-07-19 NOTE — PROGRESS NOTES
Verified name and birth date for privacy precautions.   Chart reviewed in preparation for today's visit.     Chief Complaint   Patient presents with    Dysmenorrhea     Painful urination and urgency x 4 days           Health Maintenance Due   Topic    Hepatitis B vaccine (1 of 3 - 3-dose series)    HIV screen     Pneumococcal 0-64 years Vaccine (2 of 2 - PCV)    DTaP/Tdap/Td vaccine (3 - Td or Tdap)    COVID-19 Vaccine (6 - 2023-24 season)         Wt Readings from Last 3 Encounters:   07/19/24 117.5 kg (259 lb)   04/09/24 117.9 kg (260 lb)   04/09/24 117.9 kg (260 lb)     Temp Readings from Last 3 Encounters:   07/19/24 97.7 °F (36.5 °C) (Oral)   10/04/23 98.3 °F (36.8 °C) (Oral)   05/22/23 98.5 °F (36.9 °C) (Oral)     BP Readings from Last 3 Encounters:   07/19/24 116/76   04/09/24 138/78   10/04/23 124/84     Pulse Readings from Last 3 Encounters:   07/19/24 73   04/09/24 83   10/04/23 84       Social Determinants of Health     Tobacco Use: Medium Risk (7/19/2024)    Patient History     Smoking Tobacco Use: Former     Smokeless Tobacco Use: Never     Passive Exposure: Not on file   Alcohol Use: Not on file   Financial Resource Strain: Medium Risk (10/4/2023)    Overall Financial Resource Strain (CARDIA)     Difficulty of Paying Living Expenses: Somewhat hard   Food Insecurity: Not on file (10/4/2023)   Transportation Needs: Unknown (10/4/2023)    PRAPARE - Transportation     Lack of Transportation (Medical): Not on file     Lack of Transportation (Non-Medical): No   Physical Activity: Insufficiently Active (10/1/2023)    Exercise Vital Sign     Days of Exercise per Week: 2 days     Minutes of Exercise per Session: 20 min   Stress: Not on file   Social Connections: Not on file   Intimate Partner Violence: Not At Risk (10/1/2023)    Humiliation, Afraid, Rape, and Kick questionnaire     Fear of Current or Ex-Partner: No     Emotionally Abused: No     Physically Abused: No     Sexually Abused: No   Depression: Not at

## 2024-08-26 DIAGNOSIS — J45.41 MODERATE PERSISTENT ASTHMA WITH ACUTE EXACERBATION: ICD-10-CM

## 2024-08-26 DIAGNOSIS — F33.0 MILD EPISODE OF RECURRENT MAJOR DEPRESSIVE DISORDER (HCC): ICD-10-CM

## 2024-08-27 DIAGNOSIS — F33.0 MILD EPISODE OF RECURRENT MAJOR DEPRESSIVE DISORDER (HCC): ICD-10-CM

## 2024-08-27 DIAGNOSIS — J45.41 MODERATE PERSISTENT ASTHMA WITH ACUTE EXACERBATION: ICD-10-CM

## 2024-08-27 RX ORDER — MONTELUKAST SODIUM 10 MG/1
10 TABLET ORAL DAILY
Qty: 90 TABLET | Refills: 3 | OUTPATIENT
Start: 2024-08-27

## 2024-08-27 RX ORDER — VENLAFAXINE HYDROCHLORIDE 37.5 MG/1
CAPSULE, EXTENDED RELEASE ORAL DAILY
Qty: 90 CAPSULE | Refills: 3 | OUTPATIENT
Start: 2024-08-27

## 2024-08-27 NOTE — TELEPHONE ENCOUNTER
Pt last seen on 7/19/24. Due to return in months.    Has appt on 10/4/24.    Rx last filled on:  1/11/24 Singulair #90/3RF  12/8/23 Effexor #90/3RF

## 2024-08-28 RX ORDER — VENLAFAXINE HYDROCHLORIDE 37.5 MG/1
37.5 CAPSULE, EXTENDED RELEASE ORAL DAILY
Qty: 90 CAPSULE | Refills: 3 | OUTPATIENT
Start: 2024-08-28 | End: 2025-08-23

## 2024-08-28 RX ORDER — MONTELUKAST SODIUM 10 MG/1
10 TABLET ORAL DAILY
Qty: 90 TABLET | Refills: 3 | OUTPATIENT
Start: 2024-08-28 | End: 2025-08-23

## 2024-08-28 NOTE — TELEPHONE ENCOUNTER
Chief Complaint   Patient presents with    Medication Refill     Last Appointment with Dr. Hernandez:  7/19/2024   Future Appointments   Date Time Provider Department Center   10/1/2024  9:30 AM Ai Funez DO [facility]   10/4/2024 10:30 AM Don Hernandez DO [facility] DEP

## 2024-10-01 ENCOUNTER — OFFICE VISIT (OUTPATIENT)
Age: 44
End: 2024-10-01
Payer: COMMERCIAL

## 2024-10-01 VITALS
SYSTOLIC BLOOD PRESSURE: 126 MMHG | HEART RATE: 104 BPM | OXYGEN SATURATION: 98 % | DIASTOLIC BLOOD PRESSURE: 70 MMHG | WEIGHT: 245 LBS | HEIGHT: 70 IN | BODY MASS INDEX: 35.07 KG/M2

## 2024-10-01 DIAGNOSIS — Z87.898 HISTORY OF SNORING: ICD-10-CM

## 2024-10-01 DIAGNOSIS — F32.A DEPRESSION, UNSPECIFIED DEPRESSION TYPE: ICD-10-CM

## 2024-10-01 DIAGNOSIS — G43.109 MIGRAINE WITH AURA AND WITHOUT STATUS MIGRAINOSUS, NOT INTRACTABLE: Primary | ICD-10-CM

## 2024-10-01 PROCEDURE — 99214 OFFICE O/P EST MOD 30 MIN: CPT | Performed by: PSYCHIATRY & NEUROLOGY

## 2024-10-01 RX ORDER — TOPIRAMATE 50 MG/1
50 TABLET, FILM COATED ORAL
Qty: 90 TABLET | Refills: 1 | Status: SHIPPED | OUTPATIENT
Start: 2024-10-01

## 2024-10-01 ASSESSMENT — PATIENT HEALTH QUESTIONNAIRE - PHQ9
2. FEELING DOWN, DEPRESSED OR HOPELESS: SEVERAL DAYS
SUM OF ALL RESPONSES TO PHQ QUESTIONS 1-9: 2
1. LITTLE INTEREST OR PLEASURE IN DOING THINGS: SEVERAL DAYS
SUM OF ALL RESPONSES TO PHQ9 QUESTIONS 1 & 2: 2

## 2024-10-01 NOTE — PROGRESS NOTES
PCP via electronic correspondence. The patient has received an after-visit summary and questions were answered concerning future plans. I have discussed medication side effects and warnings with the patient as well.      Signed:  Ai Funez DO  10/1/2024  9:39 AM

## 2024-10-01 NOTE — PATIENT INSTRUCTIONS
Psychiatry formerly [facility]  Address: [address] [place] [address]  Phone: [phone]    [facility] [place]   Address: [address], [place] [address]  Phone: [phone]    [facility] (In person/virtual)   [place] Location  Address: [address], [place] [address]  Phone: [phone]    [place] Location  Address: [address] [place] [address]  Phone: [phone]    [place] Location  Address: [address], [place] [address]  Phone: [phone]    [place] Location  Address: [address], [place] [address]  Phone: [phone]    [place] Location  Address: [address], [place] [address]  Phone: [phone]    [facility]  Address: [address], [place] [address]  Phone: [phone]    [facility]  [place]  [address] [place]  Phone: [phone]    [place]  [address]  [place], [place]  Phone: [phone]    [facility]  Address: [address] [place] [address]  Phone: [phone]    [facility]  Address: [address], [place] [address]  Phone: [phone]    [facility]  Address: [address], [place] [address]  Phone: [phone]    [facility], [facility]  Address: [address] [address]  Phone: [phone]    [facility], [facility]  Address: [address] [place] [address]  Phone: [phone]

## 2024-10-04 ENCOUNTER — OFFICE VISIT (OUTPATIENT)
Age: 44
End: 2024-10-04

## 2024-10-04 VITALS
RESPIRATION RATE: 16 BRPM | WEIGHT: 245 LBS | OXYGEN SATURATION: 96 % | DIASTOLIC BLOOD PRESSURE: 68 MMHG | SYSTOLIC BLOOD PRESSURE: 105 MMHG | BODY MASS INDEX: 37.13 KG/M2 | HEIGHT: 68 IN | HEART RATE: 70 BPM | TEMPERATURE: 98.2 F

## 2024-10-04 DIAGNOSIS — F41.1 GENERALIZED ANXIETY DISORDER WITH PANIC ATTACKS: ICD-10-CM

## 2024-10-04 DIAGNOSIS — R53.83 FATIGUE, UNSPECIFIED TYPE: ICD-10-CM

## 2024-10-04 DIAGNOSIS — E66.812 CLASS 2 OBESITY DUE TO EXCESS CALORIES WITHOUT SERIOUS COMORBIDITY WITH BODY MASS INDEX (BMI) OF 37.0 TO 37.9 IN ADULT: ICD-10-CM

## 2024-10-04 DIAGNOSIS — G43.109 MIGRAINE WITH AURA AND WITHOUT STATUS MIGRAINOSUS, NOT INTRACTABLE: ICD-10-CM

## 2024-10-04 DIAGNOSIS — Z23 ENCOUNTER FOR IMMUNIZATION: ICD-10-CM

## 2024-10-04 DIAGNOSIS — E55.9 VITAMIN D DEFICIENCY: ICD-10-CM

## 2024-10-04 DIAGNOSIS — Z00.00 ENCOUNTER FOR WELL ADULT EXAM WITHOUT ABNORMAL FINDINGS: ICD-10-CM

## 2024-10-04 DIAGNOSIS — F33.0 MILD EPISODE OF RECURRENT MAJOR DEPRESSIVE DISORDER (HCC): ICD-10-CM

## 2024-10-04 DIAGNOSIS — E66.09 CLASS 2 OBESITY DUE TO EXCESS CALORIES WITHOUT SERIOUS COMORBIDITY WITH BODY MASS INDEX (BMI) OF 37.0 TO 37.9 IN ADULT: ICD-10-CM

## 2024-10-04 DIAGNOSIS — F41.0 GENERALIZED ANXIETY DISORDER WITH PANIC ATTACKS: ICD-10-CM

## 2024-10-04 DIAGNOSIS — Z00.00 ENCOUNTER FOR WELL ADULT EXAM WITHOUT ABNORMAL FINDINGS: Primary | ICD-10-CM

## 2024-10-04 RX ORDER — VENLAFAXINE HYDROCHLORIDE 75 MG/1
75 CAPSULE, EXTENDED RELEASE ORAL DAILY
Qty: 90 CAPSULE | Refills: 3 | Status: SHIPPED | OUTPATIENT
Start: 2024-10-04

## 2024-10-04 RX ORDER — LORAZEPAM 0.5 MG/1
0.5 TABLET ORAL EVERY 8 HOURS PRN
Qty: 45 TABLET | Refills: 0 | Status: SHIPPED | OUTPATIENT
Start: 2024-10-04 | End: 2024-11-03

## 2024-10-04 RX ORDER — SEMAGLUTIDE 1.7 MG/.75ML
1.7 INJECTION, SOLUTION SUBCUTANEOUS
Qty: 3 ML | Refills: 2 | Status: SHIPPED | OUTPATIENT
Start: 2024-10-04

## 2024-10-04 SDOH — ECONOMIC STABILITY: FOOD INSECURITY: WITHIN THE PAST 12 MONTHS, YOU WORRIED THAT YOUR FOOD WOULD RUN OUT BEFORE YOU GOT MONEY TO BUY MORE.: NEVER TRUE

## 2024-10-04 SDOH — ECONOMIC STABILITY: FOOD INSECURITY: WITHIN THE PAST 12 MONTHS, THE FOOD YOU BOUGHT JUST DIDN'T LAST AND YOU DIDN'T HAVE MONEY TO GET MORE.: NEVER TRUE

## 2024-10-04 SDOH — ECONOMIC STABILITY: INCOME INSECURITY: HOW HARD IS IT FOR YOU TO PAY FOR THE VERY BASICS LIKE FOOD, HOUSING, MEDICAL CARE, AND HEATING?: NOT HARD AT ALL

## 2024-10-04 ASSESSMENT — ENCOUNTER SYMPTOMS
CONSTIPATION: 0
VOMITING: 0
ABDOMINAL PAIN: 0
NAUSEA: 0
COUGH: 0
BLOOD IN STOOL: 0
DIARRHEA: 0
SHORTNESS OF BREATH: 0

## 2024-10-04 NOTE — ASSESSMENT & PLAN NOTE
Discussed risk and benefit of short-term benzodiazepine for acute panic attacks while we are titrating Effexor. She has been on Xanax periodically in the past and is agreeable. Advised that I do not prescribe long-term benzo therapy and she verbalizes understanding. She also plans to work on establishing with a new therapist as well

## 2024-10-04 NOTE — ASSESSMENT & PLAN NOTE
Increase Effexor to 75mg daily. She will reach out and let me know in 4-6 weeks how she is doing - advised her we can increase to 150mg if needed

## 2024-10-04 NOTE — PROGRESS NOTES
Verified name and birth date for privacy precautions.   Chart reviewed in preparation for today's visit.     Chief Complaint   Patient presents with    Annual Exam          Health Maintenance Due   Topic    HIV screen     Hepatitis B vaccine (1 of 3 - 19+ 3-dose series)    Pneumococcal 0-64 years Vaccine (2 of 2 - PCV)    DTaP/Tdap/Td vaccine (3 - Td or Tdap)    Flu vaccine (1)    COVID-19 Vaccine (6 - 2023-24 season)         Wt Readings from Last 3 Encounters:   10/04/24 111.1 kg (245 lb)   10/01/24 111.1 kg (245 lb)   07/19/24 117.5 kg (259 lb)     Temp Readings from Last 3 Encounters:   10/04/24 98.2 °F (36.8 °C)   07/19/24 97.7 °F (36.5 °C) (Oral)   10/04/23 98.3 °F (36.8 °C) (Oral)     BP Readings from Last 3 Encounters:   10/04/24 105/68   10/01/24 126/70   07/19/24 116/76     Pulse Readings from Last 3 Encounters:   10/04/24 70   10/01/24 (!) 104   07/19/24 73       Social Determinants of Health     Tobacco Use: Medium Risk (10/4/2024)    Patient History     Smoking Tobacco Use: Former     Smokeless Tobacco Use: Never     Passive Exposure: Not on file   Alcohol Use: Not on file   Financial Resource Strain: Low Risk  (10/4/2024)    Overall Financial Resource Strain (CARDIA)     Difficulty of Paying Living Expenses: Not hard at all   Food Insecurity: No Food Insecurity (10/4/2024)    Hunger Vital Sign     Worried About Running Out of Food in the Last Year: Never true     Ran Out of Food in the Last Year: Never true   Transportation Needs: Unknown (10/4/2024)    PRAPARE - Transportation     Lack of Transportation (Medical): Not on file     Lack of Transportation (Non-Medical): No   Physical Activity: Insufficiently Active (10/1/2023)    Exercise Vital Sign     Days of Exercise per Week: 2 days     Minutes of Exercise per Session: 20 min   Stress: Not on file   Social Connections: Not on file   Intimate Partner Violence: Not At Risk (10/1/2023)    Humiliation, Afraid, Rape, and Kick questionnaire     Fear of

## 2024-10-04 NOTE — ASSESSMENT & PLAN NOTE
Improving, continue management per neurology. Encouraged her to continue working on sleep study - advised to reach out to [facility] to see if she can get in there sooner

## 2024-10-04 NOTE — ASSESSMENT & PLAN NOTE
Encouraged to continue with current diet and exercise habits and congratulated on weight loss. Increase Wegovy to 1.7mg weekly

## 2024-10-04 NOTE — PROGRESS NOTES
Well Adult Note  Name: Martha Pelaez Today’s Date: 10/4/2024   MRN: [id number] Sex: Female   Age: 44 y.o. Ethnicity:  /    : 1980 Race: White (non-)      Martha Pelaez is here for a well adult exam.       Subjective     Obesity  Has done very well with Wegovy, she is down 25lbs since her heaviest weight of 270lbs  Has reached a plateau on current dose of 1mg weekly, would like to increase to 1.7mg    Anxiety and depression  She has a lot of situational anxiety/depression and stress secondary to her in-laws living with her and her  currently  She says she feels down and depressed a lot of days and has been having panic attacks. She says there are some interpersonal issues between herself and her in-laws as well as her  and his parents that causes significant stress  Does no feel like her current dose of 37.5mg of effexor is very effective but does help some    Migraines  Having fewer migraines since she has lost weight  Also reports her  tells her she is snoring less  She has not had her sleep study done yet - she had a family emergency and has had to reschedule    Otherwise she is feeling well, denies any acute concerns today    Review of Systems   Constitutional:  Negative for chills and fever.   Respiratory:  Negative for cough and shortness of breath.    Cardiovascular:  Negative for chest pain and palpitations.   Gastrointestinal:  Negative for abdominal pain, blood in stool, constipation, diarrhea, nausea and vomiting.   Musculoskeletal:  Negative for arthralgias and myalgias.   Neurological:  Negative for dizziness, numbness and headaches.   Psychiatric/Behavioral:  Positive for dysphoric mood. Negative for sleep disturbance. The patient is nervous/anxious.        Allergies   Allergen Reactions    Adhesive Tape     Amoxicillin     Cephalosporins Hives    Oxycodone-Acetaminophen Itching    Penicillins Hives     Prior to Visit Medications    Medication Sig

## 2024-10-05 LAB
25(OH)D3 SERPL-MCNC: 34.4 NG/ML (ref 30–100)
ALBUMIN SERPL-MCNC: 4 G/DL (ref 3.5–5)
ALBUMIN/GLOB SERPL: 1 (ref 1.1–2.2)
ALP SERPL-CCNC: 90 U/L (ref 45–117)
ALT SERPL-CCNC: 17 U/L (ref 12–78)
ANION GAP SERPL CALC-SCNC: 5 MMOL/L (ref 2–12)
AST SERPL-CCNC: 8 U/L (ref 15–37)
BILIRUB SERPL-MCNC: 1.1 MG/DL (ref 0.2–1)
BUN SERPL-MCNC: 8 MG/DL (ref 6–20)
BUN/CREAT SERPL: 9 (ref 12–20)
CALCIUM SERPL-MCNC: 10.5 MG/DL (ref 8.5–10.1)
CHLORIDE SERPL-SCNC: 107 MMOL/L (ref 97–108)
CHOLEST SERPL-MCNC: 201 MG/DL
CO2 SERPL-SCNC: 26 MMOL/L (ref 21–32)
CREAT SERPL-MCNC: 0.86 MG/DL (ref 0.55–1.02)
ERYTHROCYTE [DISTWIDTH] IN BLOOD BY AUTOMATED COUNT: 13 % (ref 11.5–14.5)
EST. AVERAGE GLUCOSE BLD GHB EST-MCNC: 97 MG/DL
GLOBULIN SER CALC-MCNC: 4 G/DL (ref 2–4)
GLUCOSE SERPL-MCNC: 86 MG/DL (ref 65–100)
HBA1C MFR BLD: 5 % (ref 4–5.6)
HCT VFR BLD AUTO: 41.7 % (ref 35–47)
HDLC SERPL-MCNC: 39 MG/DL
HDLC SERPL: 5.2 (ref 0–5)
HGB BLD-MCNC: 13.7 G/DL (ref 11.5–16)
LDLC SERPL CALC-MCNC: 141.6 MG/DL (ref 0–100)
MCH RBC QN AUTO: 28.8 PG (ref 26–34)
MCHC RBC AUTO-ENTMCNC: 32.9 G/DL (ref 30–36.5)
MCV RBC AUTO: 87.6 FL (ref 80–99)
NRBC # BLD: 0 K/UL (ref 0–0.01)
NRBC BLD-RTO: 0 PER 100 WBC
PLATELET # BLD AUTO: 292 K/UL (ref 150–400)
PMV BLD AUTO: 11.1 FL (ref 8.9–12.9)
POTASSIUM SERPL-SCNC: 4.2 MMOL/L (ref 3.5–5.1)
PROT SERPL-MCNC: 8 G/DL (ref 6.4–8.2)
RBC # BLD AUTO: 4.76 M/UL (ref 3.8–5.2)
SODIUM SERPL-SCNC: 138 MMOL/L (ref 136–145)
TRIGL SERPL-MCNC: 102 MG/DL
TSH SERPL DL<=0.05 MIU/L-ACNC: 2.37 UIU/ML (ref 0.36–3.74)
VIT B12 SERPL-MCNC: 399 PG/ML (ref 193–986)
VLDLC SERPL CALC-MCNC: 20.4 MG/DL
WBC # BLD AUTO: 8.3 K/UL (ref 3.6–11)

## 2024-10-12 ENCOUNTER — HOSPITAL ENCOUNTER (EMERGENCY)
Facility: HOSPITAL | Age: 44
Discharge: HOME | End: 2024-10-12
Attending: EMERGENCY MEDICINE
Payer: COMMERCIAL

## 2024-10-12 VITALS
RESPIRATION RATE: 16 BRPM | HEART RATE: 98 BPM | OXYGEN SATURATION: 100 % | SYSTOLIC BLOOD PRESSURE: 131 MMHG | DIASTOLIC BLOOD PRESSURE: 78 MMHG | TEMPERATURE: 98.1 F

## 2024-10-12 DIAGNOSIS — N30.01 ACUTE CYSTITIS WITH HEMATURIA: Primary | ICD-10-CM

## 2024-10-12 LAB
APPEARANCE UR: ABNORMAL
BACTERIA URNS QL MICRO: ABNORMAL /HPF
BILIRUB UR QL: NEGATIVE
COLOR UR: ABNORMAL
EPITH CASTS URNS QL MICRO: ABNORMAL /LPF
GLUCOSE UR STRIP.AUTO-MCNC: NEGATIVE MG/DL
HCG UR QL: NEGATIVE
HGB UR QL STRIP: ABNORMAL
HYALINE CASTS URNS QL MICRO: ABNORMAL /LPF (ref 0–2)
KETONES UR QL STRIP.AUTO: NEGATIVE MG/DL
LEUKOCYTE ESTERASE UR QL STRIP.AUTO: ABNORMAL
NITRITE UR QL STRIP.AUTO: NEGATIVE
PH UR STRIP: 6.5 (ref 5–8)
PROT UR STRIP-MCNC: NEGATIVE MG/DL
RBC #/AREA URNS HPF: ABNORMAL /HPF (ref 0–5)
SP GR UR REFRACTOMETRY: 1.02
URINE CULTURE IF INDICATED: ABNORMAL
UROBILINOGEN UR QL STRIP.AUTO: 0.2 EU/DL (ref 0.2–1)
WBC URNS QL MICRO: >100 /HPF (ref 0–4)

## 2024-10-12 PROCEDURE — 87088 URINE BACTERIA CULTURE: CPT

## 2024-10-12 PROCEDURE — 81025 URINE PREGNANCY TEST: CPT

## 2024-10-12 PROCEDURE — 87186 SC STD MICRODIL/AGAR DIL: CPT

## 2024-10-12 PROCEDURE — 99283 EMERGENCY DEPT VISIT LOW MDM: CPT

## 2024-10-12 PROCEDURE — 81001 URINALYSIS AUTO W/SCOPE: CPT

## 2024-10-12 PROCEDURE — 87086 URINE CULTURE/COLONY COUNT: CPT

## 2024-10-12 RX ORDER — NITROFURANTOIN 25; 75 MG/1; MG/1
100 CAPSULE ORAL 2 TIMES DAILY
Qty: 14 CAPSULE | Refills: 0 | Status: SHIPPED | OUTPATIENT
Start: 2024-10-12 | End: 2024-10-19

## 2024-10-12 RX ORDER — PHENAZOPYRIDINE HYDROCHLORIDE 100 MG/1
100 TABLET, FILM COATED ORAL 3 TIMES DAILY PRN
Qty: 6 TABLET | Refills: 0 | Status: SHIPPED | OUTPATIENT
Start: 2024-10-12 | End: 2024-10-15

## 2024-10-12 NOTE — ED PROVIDER NOTES
[facility] EMERGENCY DEPT  EMERGENCY DEPARTMENT ENCOUNTER       Pt Name: Martha Pelaez  MRN: [id number]  Birthdate 1980  Date of evaluation: 10/12/2024  Provider: Deborah Issa MD   PCP: Don Hernandez DO  Note Started: 8:48 AM EDT 10/12/24     CHIEF COMPLAINT       Chief Complaint   Patient presents with    Dysuria     Onset over night with pain and urination symptoms for a ouple of days        HISTORY OF PRESENT ILLNESS: 1 or more elements      History From: patient, History limited by: none     Martha Pelaez is a 44 y.o. female who presents with 3 days of urinary frequency, pain with urination.       Please See MDM for Additional Details of the HPI/PMH  Nursing Notes were all reviewed and agreed with or any disagreements were addressed in the HPI.     REVIEW OF SYSTEMS        Positives and Pertinent negatives as per HPI.    PAST HISTORY     Past Medical History:  Past Medical History:   Diagnosis Date    Asthma     Menopause     Migraine        Past Surgical History:  Past Surgical History:   Procedure Laterality Date    HYSTERECTOMY (CERVIX STATUS UNKNOWN)         Family History:  Family History   Problem Relation Age of Onset    Diabetes Mother     High Cholesterol Mother     Fibromyalgia Mother     Diabetes Maternal Grandfather     High Cholesterol Maternal Grandfather     Breast Cancer Paternal Grandmother        Social History:  Social History     Tobacco Use    Smoking status: Former     Current packs/day: 0.00     Average packs/day: 0.5 packs/day for 3.0 years (1.5 ttl pk-yrs)     Types: Cigarettes     Start date: 2012     Quit date: 2015     Years since quittin.7    Smokeless tobacco: Never   Vaping Use    Vaping status: Never Used   Substance Use Topics    Alcohol use: Yes     Alcohol/week: 1.0 standard drink of alcohol     Types: 1 Glasses of wine per week    Drug use: Never       Allergies:  Allergies   Allergen Reactions    Adhesive Tape     Amoxicillin     Cephalosporins  Pyridium for spasm.  Follow-up with primary care advised.  ED return precautions were high fevers, intractable nausea vomiting, or inability to urinate were discussed         FINAL IMPRESSION     1. Acute cystitis with hematuria          DISPOSITION/PLAN   Martha Pelaez's  results have been reviewed with her.  She has been counseled regarding her diagnosis, treatment, and plan.  She verbally conveys understanding and agreement of the signs, symptoms, diagnosis, treatment and prognosis and additionally agrees to follow up as discussed.  She also agrees with the care-plan and conveys that all of her questions have been answered.  I have also provided discharge instructions for her that include: educational information regarding their diagnosis and treatment, and list of reasons why they would want to return to the ED prior to their follow-up appointment, should her condition change.     CLINICAL IMPRESSION    Discharge Note: The patient is stable for discharge home. The signs, symptoms, diagnosis, and discharge instructions have been discussed, understanding conveyed, and agreed upon. The patient is to follow up as recommended or return to ER should their symptoms worsen.      PATIENT REFERRED TO:  Don Hernandez DO  [address] [place] [id number] [phone]    Schedule an appointment as soon as possible for a visit          DISCHARGE MEDICATIONS:     Medication List        START taking these medications      nitrofurantoin (macrocrystal-monohydrate) 100 MG capsule  Commonly known as: Macrobid  Take 1 capsule by mouth 2 times daily for 7 days     phenazopyridine 100 MG tablet  Commonly known as: Pyridium  Take 1 tablet by mouth 3 times daily as needed for Pain            ASK your doctor about these medications      * albuterol sulfate  (90 Base) MCG/ACT inhaler  Commonly known as: PROVENTIL;VENTOLIN;PROAIR  Inhale 2 puffs into the lungs every 4 hours as needed for Wheezing or Shortness of

## 2024-10-13 LAB
BACTERIA SPEC CULT: ABNORMAL
BACTERIA SPEC CULT: ABNORMAL
CC UR VC: ABNORMAL
SERVICE CMNT-IMP: ABNORMAL

## 2024-10-15 RX ORDER — SULFAMETHOXAZOLE/TRIMETHOPRIM 800-160 MG
1 TABLET ORAL 2 TIMES DAILY
Qty: 14 TABLET | Refills: 0 | Status: SHIPPED | OUTPATIENT
Start: 2024-10-15 | End: 2024-10-22

## 2024-10-16 ENCOUNTER — TELEPHONE (OUTPATIENT)
Age: 44
End: 2024-10-16

## 2024-10-16 NOTE — TELEPHONE ENCOUNTER
Re: Nurte [address]    Rcvd PA in [facility] but predicted no PA, created case in [facility] key# [id number], submitted and awaiting outcome.     Rcvd message back that's its too soon to refill, pt must have been able to get med. PA not required

## 2024-11-19 ENCOUNTER — PATIENT MESSAGE (OUTPATIENT)
Age: 44
End: 2024-11-19

## 2024-11-20 ENCOUNTER — OFFICE VISIT (OUTPATIENT)
Age: 44
End: 2024-11-20
Payer: COMMERCIAL

## 2024-11-20 VITALS
HEIGHT: 68 IN | TEMPERATURE: 98.3 F | SYSTOLIC BLOOD PRESSURE: 110 MMHG | RESPIRATION RATE: 16 BRPM | WEIGHT: 235.8 LBS | OXYGEN SATURATION: 95 % | HEART RATE: 97 BPM | BODY MASS INDEX: 35.74 KG/M2 | DIASTOLIC BLOOD PRESSURE: 77 MMHG

## 2024-11-20 DIAGNOSIS — R09.81 SINUS CONGESTION: Primary | ICD-10-CM

## 2024-11-20 DIAGNOSIS — J01.00 ACUTE NON-RECURRENT MAXILLARY SINUSITIS: ICD-10-CM

## 2024-11-20 LAB
QUICKVUE INFLUENZA TEST: NEGATIVE
VALID INTERNAL CONTROL, POC: YES

## 2024-11-20 PROCEDURE — 87804 INFLUENZA ASSAY W/OPTIC: CPT | Performed by: NURSE PRACTITIONER

## 2024-11-20 PROCEDURE — 99213 OFFICE O/P EST LOW 20 MIN: CPT | Performed by: NURSE PRACTITIONER

## 2024-11-20 RX ORDER — DOXYCYCLINE HYCLATE 100 MG
100 TABLET ORAL 2 TIMES DAILY
Qty: 20 TABLET | Refills: 0 | Status: SHIPPED | OUTPATIENT
Start: 2024-11-20 | End: 2024-11-30

## 2024-11-20 ASSESSMENT — ENCOUNTER SYMPTOMS
COUGH: 1
SINUS PAIN: 1

## 2024-11-20 NOTE — PROGRESS NOTES
Martha Pelaez (:  1980) is a 44 y.o. female,here for evaluation of the following chief complaint(s):  Cough, Chills, Pharyngitis, and Congestion    /77 (Site: Right Upper Arm, Position: Sitting, Cuff Size: Large Adult)   Pulse 97   Temp 98.3 °F (36.8 °C) (Temporal)   Resp 16   Ht 1.727 m (5' 8\")   Wt 107 kg (235 lb 12.8 oz)   LMP 10/01/2013   SpO2 95%   BMI 35.85 kg/m²       SUBJECTIVE/OBJECTIVE:    HPI:Patient reports chest congestion started last week. The past 2 days she notes severe sinus pain radiating to upper teeth. Congestion has turned yellow. No fever. She has been exposed to flu at her office.she has noticed some wheezing and has used albuterol as needed. Last time was last night. She has taken advil sinus with little relief.    Review of Systems   HENT:  Positive for congestion and sinus pain.    Respiratory:  Positive for cough.        Physical Exam  Constitutional:       Appearance: Normal appearance.   HENT:      Head: Normocephalic.      Right Ear: Tympanic membrane, ear canal and external ear normal.      Left Ear: Tympanic membrane, ear canal and external ear normal.      Nose: Congestion present.      Comments: Severe right maxillary and frontal sinus pain radiating to upper teeth; purulent congestion, swollen turbinates     Mouth/Throat:      Mouth: Mucous membranes are moist.      Pharynx: Oropharynx is clear.   Cardiovascular:      Rate and Rhythm: Normal rate and regular rhythm.   Pulmonary:      Effort: Pulmonary effort is normal.      Breath sounds: Normal breath sounds.   Skin:     General: Skin is warm and dry.   Neurological:      General: No focal deficit present.      Mental Status: She is alert and oriented to person, place, and time.   Psychiatric:         Mood and Affect: Mood normal.         Behavior: Behavior normal.        Results for orders placed or performed in visit on 24   AMB POC RAPID INFLUENZA TEST   Result Value Ref Range    Valid Internal

## 2024-12-28 DIAGNOSIS — E66.09 CLASS 2 OBESITY DUE TO EXCESS CALORIES WITHOUT SERIOUS COMORBIDITY WITH BODY MASS INDEX (BMI) OF 37.0 TO 37.9 IN ADULT: ICD-10-CM

## 2024-12-28 DIAGNOSIS — E66.812 CLASS 2 OBESITY DUE TO EXCESS CALORIES WITHOUT SERIOUS COMORBIDITY WITH BODY MASS INDEX (BMI) OF 37.0 TO 37.9 IN ADULT: ICD-10-CM

## 2024-12-30 RX ORDER — SEMAGLUTIDE 1.7 MG/.75ML
1.7 INJECTION, SOLUTION SUBCUTANEOUS
Qty: 3 ML | Refills: 2 | Status: SHIPPED | OUTPATIENT
Start: 2024-12-30

## 2024-12-30 NOTE — TELEPHONE ENCOUNTER
Last office visit 11/20/24  NEXT APPT  With Internal Medicine (Don Hernandez DO)  01/06/2025 at 9:30 AM        Last fill on wegovy 10/04/2024 3ml with 2 additional refills

## 2025-01-14 DIAGNOSIS — E66.812 CLASS 2 OBESITY DUE TO EXCESS CALORIES WITHOUT SERIOUS COMORBIDITY WITH BODY MASS INDEX (BMI) OF 37.0 TO 37.9 IN ADULT: Primary | ICD-10-CM

## 2025-01-14 DIAGNOSIS — E66.09 CLASS 2 OBESITY DUE TO EXCESS CALORIES WITHOUT SERIOUS COMORBIDITY WITH BODY MASS INDEX (BMI) OF 37.0 TO 37.9 IN ADULT: Primary | ICD-10-CM

## 2025-02-07 ENCOUNTER — PATIENT MESSAGE (OUTPATIENT)
Age: 45
End: 2025-02-07

## 2025-02-07 DIAGNOSIS — E66.812 CLASS 2 OBESITY DUE TO EXCESS CALORIES WITHOUT SERIOUS COMORBIDITY WITH BODY MASS INDEX (BMI) OF 37.0 TO 37.9 IN ADULT: Primary | ICD-10-CM

## 2025-02-07 DIAGNOSIS — E66.09 CLASS 2 OBESITY DUE TO EXCESS CALORIES WITHOUT SERIOUS COMORBIDITY WITH BODY MASS INDEX (BMI) OF 37.0 TO 37.9 IN ADULT: Primary | ICD-10-CM

## 2025-02-28 ENCOUNTER — OFFICE VISIT (OUTPATIENT)
Age: 45
End: 2025-02-28
Payer: COMMERCIAL

## 2025-02-28 VITALS
HEART RATE: 68 BPM | WEIGHT: 243 LBS | HEIGHT: 69 IN | OXYGEN SATURATION: 98 % | RESPIRATION RATE: 18 BRPM | TEMPERATURE: 98.4 F | SYSTOLIC BLOOD PRESSURE: 114 MMHG | DIASTOLIC BLOOD PRESSURE: 63 MMHG | BODY MASS INDEX: 35.99 KG/M2

## 2025-02-28 DIAGNOSIS — F41.1 GENERALIZED ANXIETY DISORDER WITH PANIC ATTACKS: ICD-10-CM

## 2025-02-28 DIAGNOSIS — G47.33 OSA (OBSTRUCTIVE SLEEP APNEA): Primary | ICD-10-CM

## 2025-02-28 DIAGNOSIS — E66.812 CLASS 2 OBESITY DUE TO EXCESS CALORIES WITHOUT SERIOUS COMORBIDITY WITH BODY MASS INDEX (BMI) OF 37.0 TO 37.9 IN ADULT: ICD-10-CM

## 2025-02-28 DIAGNOSIS — F41.0 GENERALIZED ANXIETY DISORDER WITH PANIC ATTACKS: ICD-10-CM

## 2025-02-28 DIAGNOSIS — E66.09 CLASS 2 OBESITY DUE TO EXCESS CALORIES WITHOUT SERIOUS COMORBIDITY WITH BODY MASS INDEX (BMI) OF 37.0 TO 37.9 IN ADULT: ICD-10-CM

## 2025-02-28 PROCEDURE — 99213 OFFICE O/P EST LOW 20 MIN: CPT | Performed by: STUDENT IN AN ORGANIZED HEALTH CARE EDUCATION/TRAINING PROGRAM

## 2025-02-28 SDOH — ECONOMIC STABILITY: TRANSPORTATION INSECURITY
IN THE PAST 12 MONTHS, HAS THE LACK OF TRANSPORTATION KEPT YOU FROM MEDICAL APPOINTMENTS OR FROM GETTING MEDICATIONS?: NO

## 2025-02-28 SDOH — ECONOMIC STABILITY: FOOD INSECURITY: WITHIN THE PAST 12 MONTHS, YOU WORRIED THAT YOUR FOOD WOULD RUN OUT BEFORE YOU GOT MONEY TO BUY MORE.: SOMETIMES TRUE

## 2025-02-28 SDOH — ECONOMIC STABILITY: FOOD INSECURITY: WITHIN THE PAST 12 MONTHS, THE FOOD YOU BOUGHT JUST DIDN'T LAST AND YOU DIDN'T HAVE MONEY TO GET MORE.: SOMETIMES TRUE

## 2025-02-28 SDOH — ECONOMIC STABILITY: INCOME INSECURITY: IN THE LAST 12 MONTHS, WAS THERE A TIME WHEN YOU WERE NOT ABLE TO PAY THE MORTGAGE OR RENT ON TIME?: YES

## 2025-02-28 SDOH — ECONOMIC STABILITY: TRANSPORTATION INSECURITY
IN THE PAST 12 MONTHS, HAS LACK OF TRANSPORTATION KEPT YOU FROM MEETINGS, WORK, OR FROM GETTING THINGS NEEDED FOR DAILY LIVING?: NO

## 2025-02-28 ASSESSMENT — PATIENT HEALTH QUESTIONNAIRE - PHQ9
2. FEELING DOWN, DEPRESSED OR HOPELESS: MORE THAN HALF THE DAYS
8. MOVING OR SPEAKING SO SLOWLY THAT OTHER PEOPLE COULD HAVE NOTICED. OR THE OPPOSITE, BEING SO FIGETY OR RESTLESS THAT YOU HAVE BEEN MOVING AROUND A LOT MORE THAN USUAL: NOT AT ALL
SUM OF ALL RESPONSES TO PHQ QUESTIONS 1-9: 8
SUM OF ALL RESPONSES TO PHQ QUESTIONS 1-9: 8
6. FEELING BAD ABOUT YOURSELF - OR THAT YOU ARE A FAILURE OR HAVE LET YOURSELF OR YOUR FAMILY DOWN: NOT AT ALL
3. TROUBLE FALLING OR STAYING ASLEEP: SEVERAL DAYS
4. FEELING TIRED OR HAVING LITTLE ENERGY: NEARLY EVERY DAY
5. POOR APPETITE OR OVEREATING: MORE THAN HALF THE DAYS
SUM OF ALL RESPONSES TO PHQ QUESTIONS 1-9: 8
SUM OF ALL RESPONSES TO PHQ9 QUESTIONS 1 & 2: 2
SUM OF ALL RESPONSES TO PHQ QUESTIONS 1-9: 8
7. TROUBLE CONCENTRATING ON THINGS, SUCH AS READING THE NEWSPAPER OR WATCHING TELEVISION: NOT AT ALL
1. LITTLE INTEREST OR PLEASURE IN DOING THINGS: NOT AT ALL
9. THOUGHTS THAT YOU WOULD BE BETTER OFF DEAD, OR OF HURTING YOURSELF: NOT AT ALL
10. IF YOU CHECKED OFF ANY PROBLEMS, HOW DIFFICULT HAVE THESE PROBLEMS MADE IT FOR YOU TO DO YOUR WORK, TAKE CARE OF THINGS AT HOME, OR GET ALONG WITH OTHER PEOPLE: VERY DIFFICULT

## 2025-02-28 NOTE — PROGRESS NOTES
Patient identified with two identification factors, Name and Date of Birth.    Chief Complaint   Patient presents with    Follow-up     3 month follow-up for weight management     Discuss Medications     Insurance changed some medications are not covered        /63 (Site: Right Upper Arm, Position: Sitting, Cuff Size: Large Adult)   Pulse 68   Temp 98.4 °F (36.9 °C) (Temporal)   Resp 18   Ht 1.753 m (5' 9\")   Wt 110.2 kg (243 lb)   LMP 10/01/2013   SpO2 98%   BMI 35.88 kg/m²       1. \"Have you been to the ER, urgent care clinic since your last visit?  Hospitalized since your last visit?\" No     2. \"Have you seen or consulted any other health care providers outside of the [facility] since your last visit?\" No

## 2025-02-28 NOTE — PROGRESS NOTES
Martha Pelaez is a 45 y.o. year old female who presents today (02/28/25) for follow-up/ Follow-up (3 month follow-up for weight management ) and Discuss Medications (Insurance changed some medications are not covered )      Assessment & Plan:   1. WESTON (obstructive sleep apnea)  -     Semaglutide-Weight Management (WEGOVY) 0.25 MG/0.5ML SOAJ SC injection; Inject 0.25 mg into the skin every 7 days, Disp-2 mL, R-0Normal  2. Class 2 obesity due to excess calories without serious comorbidity with body mass index (BMI) of 37.0 to 37.9 in adult  - Will see if Wegovy is covered by her new plan for WESTON. Has not had updated sleep study but highly suspect this based on her symptoms and particularly note of worsening since stopping the medication. If not covered will send Rx for bupropion/naltrexone.       Return in about 3 months (around 5/28/2025) for Medication Followup.    Subjective:   Here today for weight management followup. She has changed jobs and unfortunately her insurance no longer covers weight loss medications. She says since stopping Wegovy her appetite has been much higher and she has gained about 10 lbs. Does note she has been getting more exercise recently with job change. She is more active in her new role and has been going to the mall/grocery store to walk with the cold weather.     Waiting for sleep study to confirm diagnosis of WESTON. Notes since stopping Wegovy she has been snoring more and says that it is making a noticeable difference in her sleep and energy level.       Review of Systems:  Review of Systems   All other systems reviewed and are negative.        PMHx:    Patient Active Problem List   Diagnosis    Mild episode of recurrent major depressive disorder    Migraine with aura and without status migrainosus, not intractable    Class 2 obesity due to excess calories without serious comorbidity with body mass index (BMI) of 37.0 to 37.9 in adult    Generalized anxiety disorder with panic

## 2025-03-02 DIAGNOSIS — J45.41 MODERATE PERSISTENT ASTHMA WITH ACUTE EXACERBATION: ICD-10-CM

## 2025-03-03 NOTE — TELEPHONE ENCOUNTER
Last office visit 2/28/25  Next Appt  With Internal Medicine (Don Hernandez DO)  05/06/2025 at 8:30 AM    Last fill on lorazepam 10/4/24 #45 with no additional refill

## 2025-03-03 NOTE — TELEPHONE ENCOUNTER
Last office visit 2/28/25  Next Appt  With Internal Medicine (Don Hernandez DO)  05/06/2025 at 8:30 AM    Last fill on singulair 1/11/24 #90 with 3 additional refills

## 2025-03-04 RX ORDER — LORAZEPAM 0.5 MG/1
0.5 TABLET ORAL EVERY 8 HOURS PRN
Qty: 45 TABLET | Refills: 0 | Status: SHIPPED | OUTPATIENT
Start: 2025-03-04 | End: 2025-04-03

## 2025-03-04 RX ORDER — MONTELUKAST SODIUM 10 MG/1
10 TABLET ORAL DAILY
Qty: 90 TABLET | Refills: 3 | Status: SHIPPED | OUTPATIENT
Start: 2025-03-04

## 2025-03-11 DIAGNOSIS — E66.09 CLASS 2 OBESITY DUE TO EXCESS CALORIES WITHOUT SERIOUS COMORBIDITY WITH BODY MASS INDEX (BMI) OF 37.0 TO 37.9 IN ADULT: Primary | ICD-10-CM

## 2025-03-11 DIAGNOSIS — E66.812 CLASS 2 OBESITY DUE TO EXCESS CALORIES WITHOUT SERIOUS COMORBIDITY WITH BODY MASS INDEX (BMI) OF 37.0 TO 37.9 IN ADULT: Primary | ICD-10-CM

## 2025-03-11 RX ORDER — BUPROPION HYDROCHLORIDE 75 MG/1
75 TABLET ORAL 2 TIMES DAILY
Qty: 180 TABLET | Refills: 1 | Status: SHIPPED | OUTPATIENT
Start: 2025-03-11 | End: 2025-09-07

## 2025-03-27 DIAGNOSIS — E66.812 CLASS 2 OBESITY DUE TO EXCESS CALORIES WITHOUT SERIOUS COMORBIDITY WITH BODY MASS INDEX (BMI) OF 37.0 TO 37.9 IN ADULT: ICD-10-CM

## 2025-03-27 DIAGNOSIS — E66.09 CLASS 2 OBESITY DUE TO EXCESS CALORIES WITHOUT SERIOUS COMORBIDITY WITH BODY MASS INDEX (BMI) OF 37.0 TO 37.9 IN ADULT: ICD-10-CM

## 2025-03-27 RX ORDER — BUPROPION HYDROCHLORIDE 75 MG/1
75 TABLET ORAL 2 TIMES DAILY
Qty: 180 TABLET | Refills: 1 | Status: CANCELLED | OUTPATIENT
Start: 2025-03-27 | End: 2025-09-23

## 2025-03-29 DIAGNOSIS — F41.0 GENERALIZED ANXIETY DISORDER WITH PANIC ATTACKS: ICD-10-CM

## 2025-03-29 DIAGNOSIS — F41.1 GENERALIZED ANXIETY DISORDER WITH PANIC ATTACKS: ICD-10-CM

## 2025-03-31 DIAGNOSIS — E66.812 CLASS 2 OBESITY DUE TO EXCESS CALORIES WITHOUT SERIOUS COMORBIDITY WITH BODY MASS INDEX (BMI) OF 37.0 TO 37.9 IN ADULT: Primary | ICD-10-CM

## 2025-03-31 DIAGNOSIS — E66.09 CLASS 2 OBESITY DUE TO EXCESS CALORIES WITHOUT SERIOUS COMORBIDITY WITH BODY MASS INDEX (BMI) OF 37.0 TO 37.9 IN ADULT: Primary | ICD-10-CM

## 2025-03-31 RX ORDER — NALTREXONE HYDROCHLORIDE 50 MG/1
25 TABLET, FILM COATED ORAL DAILY
Qty: 45 TABLET | Refills: 3 | Status: SHIPPED | OUTPATIENT
Start: 2025-03-31 | End: 2026-03-26

## 2025-03-31 RX ORDER — LORAZEPAM 0.5 MG/1
0.5 TABLET ORAL EVERY 8 HOURS PRN
Qty: 45 TABLET | Refills: 0 | OUTPATIENT
Start: 2025-03-31 | End: 2025-04-30

## 2025-03-31 NOTE — TELEPHONE ENCOUNTER
Last office visit 2/28/25  Next Appt  With Internal Medicine (Don Hernandez DO)  05/06/2025 at 8:30 AM    Last fill on lorazepam 3/4/25 #45 with no additional refills

## 2025-04-18 ENCOUNTER — OFFICE VISIT (OUTPATIENT)
Age: 45
End: 2025-04-18
Payer: COMMERCIAL

## 2025-04-18 VITALS
OXYGEN SATURATION: 99 % | BODY MASS INDEX: 37.03 KG/M2 | SYSTOLIC BLOOD PRESSURE: 129 MMHG | RESPIRATION RATE: 16 BRPM | HEART RATE: 88 BPM | DIASTOLIC BLOOD PRESSURE: 88 MMHG | TEMPERATURE: 97.8 F | WEIGHT: 250 LBS | HEIGHT: 69 IN

## 2025-04-18 DIAGNOSIS — N30.00 ACUTE CYSTITIS WITHOUT HEMATURIA: Primary | ICD-10-CM

## 2025-04-18 LAB
BILIRUBIN, URINE, POC: NEGATIVE
BLOOD URINE, POC: NORMAL
GLUCOSE URINE, POC: NEGATIVE
KETONES, URINE, POC: NEGATIVE
LEUKOCYTE ESTERASE, URINE, POC: NEGATIVE
NITRITE, URINE, POC: NEGATIVE
PH, URINE, POC: 7 (ref 4.6–8)
PROTEIN,URINE, POC: NEGATIVE
SPECIFIC GRAVITY, URINE, POC: 1.03 (ref 1–1.03)
URINALYSIS CLARITY, POC: CLEAR
URINALYSIS COLOR, POC: YELLOW
UROBILINOGEN, POC: NORMAL MG/DL

## 2025-04-18 PROCEDURE — 99213 OFFICE O/P EST LOW 20 MIN: CPT

## 2025-04-18 PROCEDURE — 81001 URINALYSIS AUTO W/SCOPE: CPT

## 2025-04-18 RX ORDER — SULFAMETHOXAZOLE AND TRIMETHOPRIM 800; 160 MG/1; MG/1
1 TABLET ORAL 2 TIMES DAILY
Qty: 10 TABLET | Refills: 0 | Status: SHIPPED | OUTPATIENT
Start: 2025-04-18 | End: 2025-04-23

## 2025-04-18 SDOH — ECONOMIC STABILITY: FOOD INSECURITY: WITHIN THE PAST 12 MONTHS, YOU WORRIED THAT YOUR FOOD WOULD RUN OUT BEFORE YOU GOT MONEY TO BUY MORE.: NEVER TRUE

## 2025-04-18 SDOH — ECONOMIC STABILITY: FOOD INSECURITY: WITHIN THE PAST 12 MONTHS, THE FOOD YOU BOUGHT JUST DIDN'T LAST AND YOU DIDN'T HAVE MONEY TO GET MORE.: NEVER TRUE

## 2025-04-18 NOTE — PROGRESS NOTES
Martha Pelaez is a 45 y.o. female who was seen in clinic today (4/18/2025) for an acute visit.      Assessment & Plan:   Below is the assessment and plan developed based on review of pertinent history, physical exam, labs, studies, and medications.    Assessment & Plan  Acute cystitis without hematuria   Acute condition, new,  2 days of UTI symptoms, POC UA with trace blood. Will send for culture and treat empirically with Bactrim based on prior sensitivities.    Orders:    AMB POC URINALYSIS DIP STICK AUTO W/ MICRO    Culture, Urine; Future    sulfamethoxazole-trimethoprim (BACTRIM DS;SEPTRA DS) 800-160 MG per tablet; Take 1 tablet by mouth 2 times daily for 5 days      Subjective/Objective:   Martha was seen today for Urinary Tract Infection (Started 2 day ago /Burning, frequently going to the bathroom, and abdomin pain/)    Presents with two days of pain/burning with urination associated with urgency/frequency. Denies fevers, chills, flank pain. Had one episode of scant hematuria at onset, none since. Per chart review, had UTI in 10/24 which was treated with Bactrim due to cx growing E. Coli intermediate to Macrobid.    Prior to Visit Medications    Medication Sig Taking? Authorizing Provider   sulfamethoxazole-trimethoprim (BACTRIM DS;SEPTRA DS) 800-160 MG per tablet Take 1 tablet by mouth 2 times daily for 5 days Yes Rogelio Saba MD   naltrexone (DEPADE) 50 MG tablet Take 0.5 tablets by mouth daily Yes Don Hernandez DO   buPROPion (WELLBUTRIN) 75 MG tablet Take 1 tablet by mouth 2 times daily Yes Don Hernandez DO   montelukast (SINGULAIR) 10 MG tablet TAKE 1 TABLET BY MOUTH EVERY DAY Yes Don Hernandez DO   venlafaxine (EFFEXOR XR) 75 MG extended release capsule Take 1 capsule by mouth daily Yes Don Hernandez DO   topiramate (TOPAMAX) 50 MG tablet Take 1 tablet by mouth nightly Yes Ai Funez, DO   NONFORMULARY Calcium with D3, Glucosamine, Lysine Yes Provider, MD Alexandro

## 2025-04-20 LAB
BACTERIA SPEC CULT: ABNORMAL
CC UR VC: ABNORMAL
SERVICE CMNT-IMP: ABNORMAL

## 2025-04-21 ENCOUNTER — RESULTS FOLLOW-UP (OUTPATIENT)
Age: 45
End: 2025-04-21

## 2025-05-06 ENCOUNTER — OFFICE VISIT (OUTPATIENT)
Age: 45
End: 2025-05-06
Payer: COMMERCIAL

## 2025-05-06 VITALS
HEART RATE: 71 BPM | HEIGHT: 69 IN | OXYGEN SATURATION: 97 % | SYSTOLIC BLOOD PRESSURE: 115 MMHG | RESPIRATION RATE: 20 BRPM | TEMPERATURE: 97.3 F | BODY MASS INDEX: 37.15 KG/M2 | WEIGHT: 250.8 LBS | DIASTOLIC BLOOD PRESSURE: 73 MMHG

## 2025-05-06 DIAGNOSIS — J45.41 MODERATE PERSISTENT ASTHMA WITH ACUTE EXACERBATION: ICD-10-CM

## 2025-05-06 PROCEDURE — 99213 OFFICE O/P EST LOW 20 MIN: CPT | Performed by: STUDENT IN AN ORGANIZED HEALTH CARE EDUCATION/TRAINING PROGRAM

## 2025-05-06 RX ORDER — ALBUTEROL SULFATE 90 UG/1
2 INHALANT RESPIRATORY (INHALATION) EVERY 4 HOURS PRN
Qty: 18 G | Refills: 3 | Status: SHIPPED | OUTPATIENT
Start: 2025-05-06

## 2025-05-06 RX ORDER — RIMEGEPANT SULFATE 75 MG/75MG
75 TABLET, ORALLY DISINTEGRATING ORAL PRN
COMMUNITY

## 2025-05-06 NOTE — PROGRESS NOTES
without status migrainosus, not intractable    Class 2 obesity due to excess calories without serious comorbidity with body mass index (BMI) of 37.0 to 37.9 in adult    Generalized anxiety disorder with panic attacks    WESTON (obstructive sleep apnea)       Prior to Admission medications    Medication Sig Start Date End Date Taking? Authorizing Provider   rimegepant sulfate (NURTEC) 75 MG TBDP Take 75 mg by mouth as needed   Yes ProviderAlexandro MD   albuterol sulfate HFA (PROVENTIL;VENTOLIN;PROAIR) 108 (90 Base) MCG/ACT inhaler Inhale 2 puffs into the lungs every 4 hours as needed for Wheezing or Shortness of Breath 5/6/25  Yes Don Hernandez DO   naltrexone (DEPADE) 50 MG tablet Take 0.5 tablets by mouth daily 3/31/25 3/26/26 Yes Don Hernandez DO   buPROPion (WELLBUTRIN) 75 MG tablet Take 1 tablet by mouth 2 times daily 3/11/25 9/7/25 Yes Don Hernandez DO   montelukast (SINGULAIR) 10 MG tablet TAKE 1 TABLET BY MOUTH EVERY DAY 3/4/25  Yes Don Hernandez DO   venlafaxine (EFFEXOR XR) 75 MG extended release capsule Take 1 capsule by mouth daily 10/4/24  Yes Don Hernandez DO   topiramate (TOPAMAX) 50 MG tablet Take 1 tablet by mouth nightly 10/1/24  Yes Ai Funez, DO   NONFORMULARY Calcium with D3, Glucosamine, Lysine   Yes ProviderAlexandro MD   albuterol (PROVENTIL) (2.5 MG/3ML) 0.083% nebulizer solution Take 3 mLs by nebulization every 4 hours as needed for Wheezing 5/22/23  Yes Elke Narayan, APRN - NP       The following sections were reviewed & updated as appropriate: Problem List, Allergies, PMH, PSH, FH, and SH.      Objective:   /73   Pulse 71   Temp 97.3 °F (36.3 °C) (Temporal)   Resp 20   Ht 1.753 m (5' 9\")   Wt 113.8 kg (250 lb 12.8 oz)   LMP 10/01/2013   SpO2 97%   BMI 37.04 kg/m²     Physical Exam  Constitutional:       General: She is not in acute distress.     Appearance: Normal appearance.   Eyes:      General: No scleral icterus.     Conjunctiva/sclera:

## 2025-05-16 ENCOUNTER — OFFICE VISIT (OUTPATIENT)
Age: 45
End: 2025-05-16
Payer: COMMERCIAL

## 2025-05-16 VITALS
WEIGHT: 253 LBS | BODY MASS INDEX: 37.47 KG/M2 | SYSTOLIC BLOOD PRESSURE: 122 MMHG | RESPIRATION RATE: 16 BRPM | HEART RATE: 73 BPM | OXYGEN SATURATION: 98 % | HEIGHT: 69 IN | DIASTOLIC BLOOD PRESSURE: 64 MMHG

## 2025-05-16 DIAGNOSIS — G43.109 MIGRAINE WITH AURA AND WITHOUT STATUS MIGRAINOSUS, NOT INTRACTABLE: Primary | ICD-10-CM

## 2025-05-16 DIAGNOSIS — F32.A DEPRESSION, UNSPECIFIED DEPRESSION TYPE: ICD-10-CM

## 2025-05-16 DIAGNOSIS — Z87.898 HISTORY OF SNORING: ICD-10-CM

## 2025-05-16 PROCEDURE — 99214 OFFICE O/P EST MOD 30 MIN: CPT | Performed by: PSYCHIATRY & NEUROLOGY

## 2025-05-16 RX ORDER — UBROGEPANT 100 MG/1
100 TABLET ORAL PRN
Qty: 16 TABLET | Refills: 5 | Status: ACTIVE | OUTPATIENT
Start: 2025-05-16

## 2025-05-16 ASSESSMENT — PATIENT HEALTH QUESTIONNAIRE - PHQ9
SUM OF ALL RESPONSES TO PHQ QUESTIONS 1-9: 0
1. LITTLE INTEREST OR PLEASURE IN DOING THINGS: NOT AT ALL
SUM OF ALL RESPONSES TO PHQ QUESTIONS 1-9: 0
2. FEELING DOWN, DEPRESSED OR HOPELESS: NOT AT ALL

## 2025-05-16 NOTE — PROGRESS NOTES
Neurology Clinic Follow up Note    Patient ID:  Martha Pelaez  [id number]  45 y.o.  1980      Ms. Pelaez is here for follow up today of  Chief Complaint   Patient presents with    Follow-up     Migraine          Last Appointment With Me:  10/1/2024    \"Martha Pelaez is presenting for evaluation of headaches. She reports onset of headaches 22 years ago after pregnancy.     Location: R sarai-orbital/retro-orbital, frontal or occipital   Character: Pressure/throbbing  Intensity: On average 8-9/10  Frequency: 2x monthly  # HA free days per month: 14  Duration: 24h-7 days  Aura: Visual aura-scotoma  Associated Sx with HA: +nausea/vomiting, +photophobia.    Neurological ROS: Denies focal weakness, numbness or vision loss associated with headaches. +Blurred vision b/l  Systemic ROS:   +Snoring  Caffeine use: 1 cup daily  H/O Head trauma: None    Any change in pattern of HA? None    Triggers: None. Non-positional.   Alleviating factors: Rest/sleep  FHx HA/migraine: Mother-migraines    Treatment so far: Imitrex PRN-worked well previously but caused fatigue  Prior preventative medications: Effexor-prescribed Oct 2023 for other indications, no change in headaches    Investigations so far:   2015 MRI Brain no acute intracranial pathology\"  Interval History:   She reports migraines are overall stable since last visit occurring 3x monthly and lasting up to 1 week on average. She awakens near daily with some degree of headache.   Headaches are localized to the occipital or frontal regions, throbbing with nausea, photophobia, intermittent dizziness.   Taking TPM 50mg qhs- no reported side effects. Using Nurtec for rescue therapy without significant relief. Also reports this is very expensive. She has failed sumatriptan/rizatriptan previously.   Depression is controlled with recent titration of Effexor. Follows with her PCP for this.   Sleep study recently completed-has not heard results of this yet.     PMHx/ PSHx/

## 2025-07-14 ENCOUNTER — PATIENT MESSAGE (OUTPATIENT)
Age: 45
End: 2025-07-14

## 2025-07-15 ENCOUNTER — TELEPHONE (OUTPATIENT)
Age: 45
End: 2025-07-15

## 2025-07-15 NOTE — TELEPHONE ENCOUNTER
Last office visit 5/6/25  Next Appt  With Internal Medicine (Don Hernandez DO)  08/05/2025 at 8:00 AM    Last fill on albuterol 5/22/23 #120 with 3 additional refills

## 2025-07-15 NOTE — TELEPHONE ENCOUNTER
Medication Refill Request    Martha Pelaez is requesting a refill of the following medication(s):   albuterol (PROVENTIL) (2.5 MG/3ML) 0.083% nebulizer solution   Please send refill to:     [facility] [address] [facility] - P [phone] - F [phone] [address] [facility] [place] [address]  Phone: [phone] Fax: [phone]

## 2025-07-15 NOTE — TELEPHONE ENCOUNTER
Pt last seen on 5/6/2025. Due to return in 3 months.    Has no appt scheduled at this time. Will forward to front office pool to call pt.    Rx last filled on 5/22/23 by prior provider.    Will forward to provider for refill.

## 2025-07-16 RX ORDER — ALBUTEROL SULFATE 0.83 MG/ML
2.5 SOLUTION RESPIRATORY (INHALATION) EVERY 4 HOURS PRN
Qty: 120 EACH | Refills: 3 | Status: SHIPPED | OUTPATIENT
Start: 2025-07-16

## 2025-08-05 ENCOUNTER — OFFICE VISIT (OUTPATIENT)
Age: 45
End: 2025-08-05
Payer: COMMERCIAL

## 2025-08-05 VITALS
SYSTOLIC BLOOD PRESSURE: 120 MMHG | OXYGEN SATURATION: 98 % | WEIGHT: 256.4 LBS | HEIGHT: 69 IN | TEMPERATURE: 97.3 F | BODY MASS INDEX: 37.98 KG/M2 | DIASTOLIC BLOOD PRESSURE: 60 MMHG | HEART RATE: 71 BPM | RESPIRATION RATE: 16 BRPM

## 2025-08-05 DIAGNOSIS — E66.812 CLASS 2 OBESITY DUE TO EXCESS CALORIES WITHOUT SERIOUS COMORBIDITY WITH BODY MASS INDEX (BMI) OF 37.0 TO 37.9 IN ADULT: ICD-10-CM

## 2025-08-05 DIAGNOSIS — F41.0 GENERALIZED ANXIETY DISORDER WITH PANIC ATTACKS: Primary | ICD-10-CM

## 2025-08-05 DIAGNOSIS — F41.1 GENERALIZED ANXIETY DISORDER WITH PANIC ATTACKS: Primary | ICD-10-CM

## 2025-08-05 DIAGNOSIS — E66.09 CLASS 2 OBESITY DUE TO EXCESS CALORIES WITHOUT SERIOUS COMORBIDITY WITH BODY MASS INDEX (BMI) OF 37.0 TO 37.9 IN ADULT: ICD-10-CM

## 2025-08-05 PROCEDURE — 99213 OFFICE O/P EST LOW 20 MIN: CPT | Performed by: STUDENT IN AN ORGANIZED HEALTH CARE EDUCATION/TRAINING PROGRAM

## 2025-08-05 RX ORDER — BUPROPION HYDROCHLORIDE 75 MG/1
75 TABLET ORAL 2 TIMES DAILY
Qty: 180 TABLET | Refills: 3 | Status: SHIPPED | OUTPATIENT
Start: 2025-08-05